# Patient Record
Sex: MALE | Race: WHITE | NOT HISPANIC OR LATINO | Employment: FULL TIME | URBAN - METROPOLITAN AREA
[De-identification: names, ages, dates, MRNs, and addresses within clinical notes are randomized per-mention and may not be internally consistent; named-entity substitution may affect disease eponyms.]

---

## 2022-08-25 ENCOUNTER — HOSPITAL ENCOUNTER (EMERGENCY)
Facility: CLINIC | Age: 42
Discharge: HOME | End: 2022-08-26
Attending: EMERGENCY MEDICINE | Admitting: EMERGENCY MEDICINE
Payer: COMMERCIAL

## 2022-08-25 DIAGNOSIS — F19.10 POLYSUBSTANCE ABUSE (H): ICD-10-CM

## 2022-08-25 LAB
ALCOHOL BREATH TEST: 0 (ref 0–0.01)
AMPHETAMINES UR QL SCN: ABNORMAL
BARBITURATES UR QL: ABNORMAL
BENZODIAZ UR QL: ABNORMAL
CANNABINOIDS UR QL SCN: ABNORMAL
COCAINE UR QL: ABNORMAL
HOLD SPECIMEN: NORMAL
HOLD SPECIMEN: NORMAL
OPIATES UR QL SCN: ABNORMAL
SARS-COV-2 RNA RESP QL NAA+PROBE: NEGATIVE

## 2022-08-25 PROCEDURE — 99219 PR INITIAL OBSERVATION CARE,LEVEL II: CPT | Performed by: EMERGENCY MEDICINE

## 2022-08-25 PROCEDURE — 90791 PSYCH DIAGNOSTIC EVALUATION: CPT

## 2022-08-25 PROCEDURE — 80307 DRUG TEST PRSMV CHEM ANLYZR: CPT | Performed by: EMERGENCY MEDICINE

## 2022-08-25 PROCEDURE — 250N000013 HC RX MED GY IP 250 OP 250 PS 637: Performed by: EMERGENCY MEDICINE

## 2022-08-25 PROCEDURE — 82075 ASSAY OF BREATH ETHANOL: CPT | Performed by: EMERGENCY MEDICINE

## 2022-08-25 PROCEDURE — C9803 HOPD COVID-19 SPEC COLLECT: HCPCS | Performed by: EMERGENCY MEDICINE

## 2022-08-25 PROCEDURE — 99285 EMERGENCY DEPT VISIT HI MDM: CPT | Mod: 25 | Performed by: EMERGENCY MEDICINE

## 2022-08-25 PROCEDURE — U0005 INFEC AGEN DETEC AMPLI PROBE: HCPCS | Performed by: EMERGENCY MEDICINE

## 2022-08-25 RX ORDER — PANTOPRAZOLE SODIUM 20 MG/1
20 TABLET, DELAYED RELEASE ORAL DAILY
Status: DISCONTINUED | OUTPATIENT
Start: 2022-08-25 | End: 2022-08-26 | Stop reason: HOSPADM

## 2022-08-25 RX ORDER — QUETIAPINE FUMARATE 50 MG/1
50 TABLET, FILM COATED ORAL AT BEDTIME
Status: DISCONTINUED | OUTPATIENT
Start: 2022-08-25 | End: 2022-08-26 | Stop reason: HOSPADM

## 2022-08-25 RX ORDER — QUETIAPINE FUMARATE 25 MG/1
1 TABLET, FILM COATED ORAL 2 TIMES DAILY
COMMUNITY
Start: 2022-03-14

## 2022-08-25 RX ORDER — QUETIAPINE FUMARATE 25 MG/1
25 TABLET, FILM COATED ORAL ONCE
Status: COMPLETED | OUTPATIENT
Start: 2022-08-25 | End: 2022-08-25

## 2022-08-25 RX ORDER — QUETIAPINE FUMARATE 25 MG/1
TABLET, FILM COATED ORAL
Qty: 90 TABLET | Refills: 0 | Status: SHIPPED | OUTPATIENT
Start: 2022-08-25

## 2022-08-25 RX ORDER — HYDROXYZINE HYDROCHLORIDE 50 MG/1
50 TABLET, FILM COATED ORAL EVERY 8 HOURS PRN
Status: DISCONTINUED | OUTPATIENT
Start: 2022-08-25 | End: 2022-08-26 | Stop reason: HOSPADM

## 2022-08-25 RX ORDER — TRAZODONE HYDROCHLORIDE 100 MG/1
1.5 TABLET ORAL DAILY
COMMUNITY
Start: 2022-03-13

## 2022-08-25 RX ORDER — SILDENAFIL 100 MG/1
1 TABLET, FILM COATED ORAL DAILY
COMMUNITY
Start: 2022-06-20 | End: 2023-05-15

## 2022-08-25 RX ORDER — LISINOPRIL 10 MG/1
10 TABLET ORAL DAILY
COMMUNITY
Start: 2022-03-12

## 2022-08-25 RX ORDER — ALBUTEROL SULFATE 90 UG/1
1-2 AEROSOL, METERED RESPIRATORY (INHALATION) DAILY
COMMUNITY
Start: 2022-06-04 | End: 2023-05-15

## 2022-08-25 RX ORDER — QUETIAPINE FUMARATE 25 MG/1
25 TABLET, FILM COATED ORAL 2 TIMES DAILY PRN
Status: DISCONTINUED | OUTPATIENT
Start: 2022-08-25 | End: 2022-08-26 | Stop reason: HOSPADM

## 2022-08-25 RX ORDER — VENLAFAXINE HYDROCHLORIDE 150 MG/1
1 CAPSULE, EXTENDED RELEASE ORAL DAILY
COMMUNITY
Start: 2022-03-13

## 2022-08-25 RX ORDER — CALCIUM CARBONATE 500 MG/1
500 TABLET, CHEWABLE ORAL DAILY PRN
Status: DISCONTINUED | OUTPATIENT
Start: 2022-08-25 | End: 2022-08-26 | Stop reason: HOSPADM

## 2022-08-25 RX ORDER — HYDROXYZINE HYDROCHLORIDE 25 MG/1
TABLET, FILM COATED ORAL
Qty: 60 TABLET | Refills: 0 | Status: SHIPPED | OUTPATIENT
Start: 2022-08-25

## 2022-08-25 RX ORDER — OMEPRAZOLE 40 MG/1
1 CAPSULE, DELAYED RELEASE ORAL DAILY
COMMUNITY
Start: 2022-06-04 | End: 2023-05-15

## 2022-08-25 RX ADMIN — ANTACID TABLETS 500 MG: 500 TABLET, CHEWABLE ORAL at 20:39

## 2022-08-25 RX ADMIN — QUETIAPINE FUMARATE 50 MG: 50 TABLET ORAL at 21:19

## 2022-08-25 RX ADMIN — QUETIAPINE FUMARATE 25 MG: 25 TABLET ORAL at 15:36

## 2022-08-25 RX ADMIN — PANTOPRAZOLE SODIUM 20 MG: 20 TABLET, DELAYED RELEASE ORAL at 20:53

## 2022-08-25 ASSESSMENT — ACTIVITIES OF DAILY LIVING (ADL)
ADLS_ACUITY_SCORE: 35
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
## 2022-08-25 NOTE — DISCHARGE INSTRUCTIONS
Substance Use Assessment Recommendation:    Walk-in behavioral health support through [facility]    Visit the [facility]  [address] [place] [address]    M-F, 9 a.m. to 9 p.m.    Sober Housing Program Recommendation:    [facility]    [address] [place] [address]    Main: [phone]     Fax: [phone]    Referral/Intake Line: [phone]    Aftercare Plan  If I am feeling unsafe or I am in a crisis, I will:   Contact my established care providers   Call the National Suicide Prevention Lifeline: 988  Go to the nearest emergency room   Call 491     Warning signs that I or other people might notice when a crisis is developing for me:  Increased paranoia that is commanding you to do harmful things    Things I am able to do on my own to cope or help me feel better: go for a walk, take deep breathes     Things that I am able to do with others to cope or help me better: talk to sober supports    Things I can use or do for distraction: This technique will take you through your five senses to help remind you of the present. This is a calming technique that can help you get through tough or stressful situations.     5 - LOOK: Look around for 5 things that you can see, and say them out loud. For example, you could say, I see the computer, I see the cup, I see the picture frame.     4 - FEEL: Pay attention to your body and think of 4 things that you can feel, and say them out loud. For example, you could say, I feel my feet warm in my socks, I feel the hair on the back of my neck, or I feel the pillow I am sitting on.    3 - LISTEN: Listen for 3 sounds. It could be the sound of traffic outside, the sound of typing or the sound of your tummy rumbling. Say the three things out loud.    2 - SMELL: Say two things you can smell. If you re allowed to, it s okay to move to another spot and sniff something. If you can t smell anything at the moment or you  "can t move, then name your 2 favorite smells.    1 - TASTE: Say one thing you can taste. It may be the toothpaste from brushing your teeth, or a mint from after lunch. If you can t taste anything, then say your favorite thing to taste.    Changes I can make to support my mental health and wellness: stop using drugs and take your medications as prescribed    People in my life that I can ask for help: Therapist, ROSALINO max, Carmen    [facility] has a mental health crisis team you can call 24/7: [facility]  [phone]      Additional resources and information:   [facility] has Walk In Hours for their Rule 25 Assessments, [address] [place], ([phone] ) at 7am   Or Walk in at [place] at [address] [facility], [place], [address], upper level [address] ([phone]) at 8am on Fridays      Crisis Lines  Crisis Text Line  Text 343817  You will be connected with a trained live crisis counselor to provide support.    Por espanol, texto  GABRIELLE a 007958 o texto a 442-AYUDAME en WhatsApp    [employer] (LGBTQ Youth Crisis Line)  5.729.515.8259  text START to 985-135      Community Resources  Fast Tracker  Linking people to mental health and substance use disorder resources  fasttrackermn.org     [place] Mental Health Warm Line  Peer to peer support  Monday thru Saturday, 12 pm to 10 pm  693.106.1528 or 1.737.961.4221  Text \"Support\" to 39840    National [place] on Mental Illness (ED)  098.380.6831 or 1.888.ED.HELPS      Mental Health Apps  My3  https://my3app.org/    VirtualHopeBox  https://PassKit.org/apps/virtual-hope-box/      Additional Information  Today you were seen by a licensed mental health professional through Triage and Transition services, [facility])  for a crisis assessment in the Emergency Department at [facility].  It is recommended that you follow up with your established providers (psychiatrist, mental health therapist, and/or primary care " doctor - as relevant) as soon as possible. Coordinators from [facility] will be calling you in the next 24-48 hours to ensure that you have the resources you need.  You can also contact [facility] coordinators directly at [phone]. You may have been scheduled for or offered an appointment with a mental health provider. [facility] maintains an extensive network of licensed behavioral health providers to connect patients with the services they need.  We do not charge providers a fee to participate in our referral network.  We match patients with providers based on a patient's specific needs, insurance coverage, and location.  Our first effort will be to refer you to a provider within your care system, and will utilize providers outside your care system as needed.
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
## 2022-08-25 NOTE — ED TRIAGE NOTES
Pt states he is having auditory hallucinations and the voices are threatening him at time and or just talking to him.  Pt also has been using meth and ALCOHOL and he last used alcohol yesterday and meth two days ago  .   Triage Assessment     Row Name 08/25/22 [id number]       Triage Assessment (Adult)    Airway WDL WDL       Respiratory WDL    Respiratory WDL WDL       Skin Circulation/Temperature WDL    Skin Circulation/Temperature WDL WDL       Cardiac WDL    Cardiac WDL WDL       Peripheral/Neurovascular WDL    Peripheral Neurovascular WDL WDL       Cognitive/Neuro/Behavioral WDL    Cognitive/Neuro/Behavioral WDL WDL

## 2022-08-25 NOTE — ED NOTES
"Pt states that he has been on a \"franco\"  He has been using alcohol and meth. Last alcohol was last night. Last meth used was several days ago  "

## 2022-08-26 VITALS
TEMPERATURE: 98.1 F | HEART RATE: 75 BPM | SYSTOLIC BLOOD PRESSURE: 155 MMHG | DIASTOLIC BLOOD PRESSURE: 102 MMHG | OXYGEN SATURATION: 97 % | RESPIRATION RATE: 18 BRPM

## 2022-08-26 PROCEDURE — 99217 PR OBSERVATION CARE DISCHARGE: CPT | Performed by: EMERGENCY MEDICINE

## 2022-08-26 PROCEDURE — 250N000013 HC RX MED GY IP 250 OP 250 PS 637: Performed by: EMERGENCY MEDICINE

## 2022-08-26 RX ORDER — VENLAFAXINE HYDROCHLORIDE 150 MG/1
150 CAPSULE, EXTENDED RELEASE ORAL
Status: DISCONTINUED | OUTPATIENT
Start: 2022-08-26 | End: 2022-08-26 | Stop reason: HOSPADM

## 2022-08-26 RX ORDER — LISINOPRIL 10 MG/1
10 TABLET ORAL DAILY
Status: DISCONTINUED | OUTPATIENT
Start: 2022-08-26 | End: 2022-08-26 | Stop reason: HOSPADM

## 2022-08-26 RX ADMIN — ANTACID TABLETS 500 MG: 500 TABLET, CHEWABLE ORAL at 03:11

## 2022-08-26 RX ADMIN — VENLAFAXINE HYDROCHLORIDE 150 MG: 150 CAPSULE, EXTENDED RELEASE ORAL at 13:09

## 2022-08-26 RX ADMIN — PANTOPRAZOLE SODIUM 20 MG: 20 TABLET, DELAYED RELEASE ORAL at 13:09

## 2022-08-26 RX ADMIN — LISINOPRIL 10 MG: 10 TABLET ORAL at 13:09

## 2022-08-26 ASSESSMENT — ACTIVITIES OF DAILY LIVING (ADL)
ADLS_ACUITY_SCORE: 35

## 2022-08-26 NOTE — DISCHARGE SUMMARY
ED Observation Discharge Summary  [facility]  Discharge Date: 8/26/2022    Quinn Cohen MRN: [id number]   Age: 42 year old YOB: 1980     Brief HPI & Initial ED Course     Chief Complaint   Patient presents with     Drug / Alcohol Assessment     Suicidal     HPI  Quinn Cohen is a 42 year old male who presented to the ED with altered mental status. Initial history was limited due to altered mental status. The patient arrived with altered mental status with reason to suspect alcohol or other drug intoxication as etiology, and an exam that was without findings suggestive of trauma.     Upon being evaluated in the emergency department, the patient was found to have a condition that would benefit from ongoing monitoring. Observation care was initiated with plan for close clinical monitoring of the patient and his mental status for clearing of intoxicating substance, and broadening of the work-up if not clearing appropriately or if other indications develop.     See ED Observation H&P for further details on the patient's presenting history and initial evaluation.     Physical Exam   BP: 129/81  Pulse: 98  Temp: 98.1  F (36.7  C)  Resp: 16  SpO2: 99 %    Physical Exam  General: no acute distress. Alert.   HENT: MMM. Atraumatic head.   Eyes: PERRL, normal sclerae   Neck: non-tender, supple  Cardio: Regular rate. Regular rhythm.   Resp: Normal work of breathing, normal respiratory rate  Abdomen: no tenderness, non-distended, no rebound, no guarding  Neuro: alert, fully oriented. Steady gait. CN II-XII grossly intact. Grossly normal strength and sensation.   Integumentary/Skin: no rash visualized, normal color    Results      Procedures              Labs Ordered and Resulted from Time of ED Arrival to Time of ED Departure   DRUG ABUSE SCREEN 1 URINE (ED) - Abnormal       Result Value    Amphetamines Urine Screen Positive (*)     Barbiturates Urine Screen Negative       Benzodiazepines Urine Screen Positive (*)     Cannabinoids Urine Screen Positive (*)     Cocaine Urine Screen Negative      Opiates Urine Screen Negative     COVID-19 VIRUS (CORONAVIRUS) BY PCR - Normal    SARS CoV2 PCR Negative     ALCOHOL BREATH TEST POCT - Normal    Alcohol Breath Test 0.00              Observation Course   The patient was admitted to observation status with plan for close clinical monitoring of the patient and his mental status for clearing of intoxicating substance, and broadening of the work-up if not clearing appropriately or if other indications develop.     Serial assessments of the patient's mental status were performed. Nursing notes were reviewed. During the observation period, the patient did not require medications for agitation, and did not require restraints/seclusion for patient and/or provider safety.        With monitoring, patient's mental status cleared. Patient then clinically sober with steady gait and tolerating PO. Normalization of mental status with sobering makes other causes of altered mental status very unlikely.     After counseling on the diagnosis, work-up, and treatment plan, the patient was discharged. Recommended safe cessation of EtOH/drugs and provided information on community treatment resources.  Patient was reassessed by the mental health  when his sober living facility would not take him back.  He was provided with outpatient resources.  Patient to return to the ED if any urgent or potentially life-threatening concerns.    Discharge Diagnoses:   Final diagnoses:   Polysubstance abuse (H)       --  Chance Javier MD  [facility] EMERGENCY DEPARTMENT  8/26/2022

## 2022-08-26 NOTE — ED NOTES
"Venus ([phone]) Director of [facility] called back and stated that Randall maddox \" we do not have anyone that will be picking up the patient. He is homeless and he needs Residential treatment or inpatient treatment. He could not keep himself sober and that really concerns me.\"  "

## 2022-08-26 NOTE — ED NOTES
Writer called Randall [phone] ( Manager of [facility]) per Randall he will [place] and have someone  pt. Randall will be calling back for updates

## 2022-08-26 NOTE — ED NOTES
"Triage & Transition Services, Extended Care     Therapy Progress Note    Patient: Quinn goes by \"Quinn,\" uses he/him pronouns  Date of Service: August 26, 2022  Site of Service: [facility]  Patient was seen in-person.     Presenting problem:   Quinn is followed related to discharge planning and referrals needed. Please see initial DEC/[facility] Crisis Assessment completed by BALWINDER Suárez on 8/25/2022 for complete assessment information. Notable concerns include polysubstance use.     Individuals Present: Quinn & DAWIT PORTILLO, JESUS, [facility]  Session start: 5:15PM  Session end: 5:35PM  Session duration in minutes: 15  Session number: 1  Anticipated number of sessions or this episode of care: 1  CPT utilized: 51070 - Psychotherapy (with patient) - 30 (16-37*) min    Current Presentation:   Writer met with patient and explained the rationale for the intervention. Patient appeared oriented x4, cooperative and engaged. Patient reported to writer that he was ready to discharge and did not feel like he needed to be in the hospital anymore. Writer validated this and informed patient of the search for crisis beds as well as outpatient resources, as patient was unable to return to his sober living home due to not being able to remain sober. Patient agreeable and informed writer that he had a safe place with a friend to discharge to. Writer reviewed resources with patient related to substance use referrals and next steps. Patient agreeable to this and informed writer that he had been discussing options for placement with his .     Mental Status Exam:   Appearance: awake, alert  Attitude: cooperative  Eye Contact: good  Mood: good  Affect: mood congruent  Speech: clear, coherent  Psychomotor Behavior: no evidence of tardive dyskinesia, dystonia, or tics  Thought Process:  logical  Associations: no loose associations  Thought Content: no evidence of suicidal ideation or homicidal " ideation  Insight: limited  Judgement: intact  Oriented to: time, person, and place  Attention Span and Concentration: intact  Recent and Remote Memory: intact    Diagnosis:   304.40 (F15.20) Moderate, Amphetamine type substance     Therapeutic Intervention(s):   Provided active listening, unconditional positive regard, and validation. Engaged in safety planning.  Explored motivation for behavioral change.    Treatment Objective(s) Addressed:   The focus of this session was on rapport building and safety planning.     Progress Towards Goals:   Patient reports stable symptoms. Patient is making progress towards treatment goals as evidenced by being receptive to discharge planning.     Case Management:   Patient reported being in contact with his  for discharge planning needs and placement.     General Recommendations:   Continue to monitor for harm. Consider: Listen in a neutral, non-judgmental way. Offer reassurance    Plan:   Writer followed up with attending MD, Dr. Javier re: to patient's disposition planning. At this time, discharge with MI/ORQUIDEA specific resources is recommended based on patient's ability to engage in safety planning and expressing a motivation to work on is substance use concerns.    Plan for Care reviewed with Assigned Medical Provider? Yes: Dr. Yaya HUFFMAN, MSW, LGSW, [facility]   Licensed Mental Health Professional (LMHP), [facility]  [phone]

## 2022-10-03 ENCOUNTER — APPOINTMENT (OUTPATIENT)
Dept: RADIOLOGY | Facility: HOSPITAL | Age: 42
End: 2022-10-03
Attending: EMERGENCY MEDICINE
Payer: COMMERCIAL

## 2022-10-03 ENCOUNTER — HOSPITAL ENCOUNTER (EMERGENCY)
Facility: HOSPITAL | Age: 42
Discharge: HOME | End: 2022-10-03
Payer: COMMERCIAL

## 2022-10-03 VITALS
OXYGEN SATURATION: 99 % | DIASTOLIC BLOOD PRESSURE: 96 MMHG | SYSTOLIC BLOOD PRESSURE: 144 MMHG | BODY MASS INDEX: 39.87 KG/M2 | HEIGHT: 69 IN | RESPIRATION RATE: 16 BRPM | HEART RATE: 97 BPM | TEMPERATURE: 98.4 F

## 2022-10-03 DIAGNOSIS — J06.9 VIRAL URI WITH COUGH: ICD-10-CM

## 2022-10-03 LAB
FLUAV RNA SPEC QL NAA+PROBE: NEGATIVE
FLUBV RNA RESP QL NAA+PROBE: NEGATIVE
RSV RNA SPEC NAA+PROBE: NEGATIVE
SARS-COV-2 RNA RESP QL NAA+PROBE: NEGATIVE

## 2022-10-03 PROCEDURE — 99283 EMERGENCY DEPT VISIT LOW MDM: CPT | Mod: CS,25

## 2022-10-03 PROCEDURE — C9803 HOPD COVID-19 SPEC COLLECT: HCPCS

## 2022-10-03 PROCEDURE — 71046 X-RAY EXAM CHEST 2 VIEWS: CPT

## 2022-10-03 PROCEDURE — 87637 SARSCOV2&INF A&B&RSV AMP PRB: CPT | Performed by: EMERGENCY MEDICINE

## 2022-10-03 RX ORDER — DEXTROMETHORPHAN POLISTIREX 30 MG/5ML
60 SUSPENSION ORAL 2 TIMES DAILY
Qty: 89 ML | Refills: 0 | Status: SHIPPED | OUTPATIENT
Start: 2022-10-03

## 2022-10-03 RX ORDER — BENZONATATE 100 MG/1
100 CAPSULE ORAL 3 TIMES DAILY PRN
Qty: 20 CAPSULE | Refills: 0 | Status: SHIPPED | OUTPATIENT
Start: 2022-10-03

## 2022-10-03 RX ORDER — IBUPROFEN 200 MG
400 TABLET ORAL ONCE
Status: DISCONTINUED | OUTPATIENT
Start: 2022-10-03 | End: 2022-10-03 | Stop reason: HOSPADM

## 2022-10-03 ASSESSMENT — ENCOUNTER SYMPTOMS
MYALGIAS: 1
LIGHT-HEADEDNESS: 0
SORE THROAT: 1
ABDOMINAL PAIN: 0
FEVER: 1
VOMITING: 0
COUGH: 1
NAUSEA: 0
HEADACHES: 0
CHILLS: 1

## 2022-10-03 NOTE — ED PROVIDER NOTES
Patient:   MARTÍNEZ SMITH            MRN: [id number]            FIN: [id number]              Age:   84 years     Sex:  FEMALE     :  37   Associated Diagnoses:   None   Author:   GRANT BARTLETT     Subjective       Patient seen and examined 4/10  Overnight events reviewed  Pericardial drain removed  Off pressors  On RA        Objective   Intake and Output   I & O between:  2020 11:11 TO 10-APR-2020 11:11  Med Dosing Weight:  83.3  kg   2020  24 Hour Intake:   240.00  ( 2.88 mL/kg )  24 Hour Output:   550.00           24 Hour Urine/Stool Output:   0.0  24 Hour Balance:   -310.00           24 Hour Urine Output:   550.00  ( 0.28 mL/kg/hr )                   Urine Count:  1         VS/Measurements   Vitals between:   2020 11:11:57   TO   10-APR-2020 11:11:57                   LAST RESULT MINIMUM MAXIMUM  Temperature 37.4 36.6 37.4  Heart Rate 82 80 92  Respiratory Rate 18 16 20  NISBP           130 104 130  NIDBP           69 59 72  NIMBP           89 73 106  SpO2                    98 95 100      Lines and Tubes:    LINES  Peripheral Intravenous Hand Left   Gauge: 22   Charted: 04/10/20 08:00  Inserted: 20   Days Since Insertion: 1 days  Indication of Use: Saline Lock   .    Support:  NO VENTILATORS QUALIFIED      Point of care testing:  Oxygen saturation  98  %, RA.    General:  Alert and oriented, No acute distress.    Eye:  no gaze preference.    HENT:  Normocephalic.    Neck:  Supple, no accessory muscle use or stridor.    Respiratory:  Respirations are non-labored, no wheezing.    Cardiovascular:  Normal rate, Regular rhythm, low-pitched 2/6 systolic murmur.   Gastrointestinal:  Soft, Non-tender, Non-distended, Normal bowel sounds.   Musculoskeletal     no edema.     Integumentary:  Warm, Pink, Intact.    Neurologic:  Alert.    Psychiatric:  Cooperative.      Results Review   General results   Interpretation:   Labs between:  2020 11:11 to 10-APR-2020  EMERGENCY DEPARTMENT ENCOUNTER      NAME: Quinn Cohen  AGE: 42 year old male  YOB: 1980  MRN: [id number]  EVALUATION DATE & TIME: No admission date for patient encounter.    PCP: [facility]    ED PROVIDER: Shavon López PA-C    Chief Complaint   Patient presents with     Cough     Shortness of Breath       FINAL IMPRESSION:  1. Viral URI with cough        MEDICAL DECISION MAKING:    Pertinent Labs & Imaging studies reviewed. (See chart for details)  Quinn Cohen is a 42 year old male who presents for evaluation of body aches, fever, chills and cough x5 days. No known sick contacts. Did not receive covid or influenza vaccinations. Patient reports he is mostly here for a work note so he can go back to work.     On my initial evaluation, vitals normal. on physical exam, patient is awake, alert in no acute distress resting comfortably in chair. Heart sounds normal, lungs clear, without wheeze or crackles. he does have a cough. no abdominal tenderness on palpation. pharynx without erythema or exudate. bilateral TMs normal without erythema or discharge. remainder of exam unremarkable    Differential diagnosis is covid, influenza, pneumonia, other viral pathology. emergency department workup included covid and influenza swabs and CXR. patient was given ibuprofen with some improvement in symptoms.    Workup thus far unremarkable. COVID and influenza negative. CXR without pneumonia. Suspect this is another viral URI. Vitals reassuring and patient otherwise well appearing. Did recommend symptomatic treatment and encouraged him to continue staying well hydrated and follow cdc recommendations guidelines for proper handwashing and social distancing.  Provided work note, Tessalon Perles and Delsym as well as strict return precautions.    Patient has had serial examinations and notes significant improvement.     Patient was discharged in stable condition with treatment plan as below.  11:11  CBC:                 WBC  HgB  Hct  Plt  MCV  RDW   10-APR-2020 6.0  (L) 9.0  (L) 29.6  286  81.1  (H) 16.1   BMP:                 Na  Cl  BUN  Glu   10-APR-2020 142  (H) 109  20  87                              K  CO2  Cr  Ca                              4.3  29  (H) 1.18  8.7   Other Chem:             Mg  Phos  Triglycerides  GGTP  DirectBili                           (H) 2.6  3.5         POC GLU:                 Latest Result  Latest Date  Minimum  Min Date  Maximum  Max Date                             83 10-APR-2020 71 09-APR-2020 (H) 110  09-APR-2020                         Impression and Plan            Assessment and Plan   # S/P shock likely obstructive and Cardiogenic shock  # Hypotension- resolved now  # Cardiac tamponade-- with obstructive shock   # s/p pericardial drain 4/6 -> 410ml serous fluid in cath lab; 50ml over the last 24 hours  - Colchicine   - ASA   - cardiac MRI neg  # Bradycardia   - after admission to MICU HR briefly in 30s with MAP in 50s, resolved with 1 dose of atropine  - no residual fluid on repeat echo 4/6   - Coreg restarted  # Chronic systolic CHF   - med mgt per Cardio   - Coreg , ACEI restarted  # COPD   - stable, no wheezing  # SALOME CKD  - Cr is slowly improving   - ATN due to shock, CTM UO   - avoid nephrotoxins  # FULL CODE  # DVT proph- heparin SQ  Altaf Beth MD  Pulmonary and critical care medicine  [facility] Critical [facility] and sleep physicians   Instructed to follow up with primary care provider in 3 days and return to the emergency department with any new or worsening of symptoms. Patient expressed understanding, feels comfortable, and is in agreement with this plan. All questions addressed prior to discharge.    ED COURSE:  4:40 PM  I reviewed the patient's chart. I met with the patient to gather history and to perform my initial exam.    I wore appropriate PPE during this encounter including: facemask & eye protection   6:34 PM I rechecked the patient and updated him on results. We discussed plan for discharge including treatment plan, follow-up and return precautions to emergency department.  Patient voiced understanding and in agreement with this plan.    At the conclusion of the encounter I discussed the results of all of the tests and the disposition. The questions were answered. The patient or family acknowledged understanding and was agreeable with the care plan.     MEDICATIONS GIVEN IN THE EMERGENCY:  Medications - No data to display    NEW PRESCRIPTIONS STARTED AT TODAY'S ER VISIT  Discharge Medication List as of 10/3/2022  6:35 PM      START taking these medications    Details   benzonatate (TESSALON) 100 MG capsule Take 1 capsule (100 mg) by mouth 3 times daily as needed for cough, Disp-20 capsule, R-0, Local Print      dextromethorphan (DELSYM) 30 MG/5ML liquid Take 10 mLs (60 mg) by mouth 2 times daily, Disp-89 mL, R-0, Local Print                  =================================================================    HPI:    Patient information was obtained from: Patient    Use of Interpretor: N/A        Quinn BRIE Cohen is a 42 year old male with a pertinent history of obesity, hypertension, diabetes mellitus type 2, GERD, polysubstance abuse (alcohol, methamphetamine) who presents to this ED by walk in for evaluation of body aches, cough. Patient endorses symptoms of body aches, a cough, and sore throat since 09/29 (4 days ago). He currently  endorses a mild fever and chills. Patient attempted to take ibuprofen and Mucinex with some relief, but his symptoms persist.    Patient reports he lives in a household with 8 other people, but is unsure if any of them are sick. Patient is not vaccinated against COVID-19 or influenza.    He denies a headache, lightheadedness, abdominal pain, nausea, vomiting. No other reported complaints at this time.      REVIEW OF SYSTEMS:  Review of Systems   Constitutional: Positive for chills and fever (low grade).   HENT: Positive for sore throat.    Respiratory: Positive for cough.    Gastrointestinal: Negative for abdominal pain, nausea and vomiting.   Musculoskeletal: Positive for myalgias (diffuse body aches).   Neurological: Negative for light-headedness and headaches.   All other systems reviewed and are negative.      PAST MEDICAL HISTORY:  No past medical history on file.    PAST SURGICAL HISTORY:  Past Surgical History:   Procedure Laterality Date     HERNIA REPAIR         CURRENT MEDICATIONS:    No current facility-administered medications for this encounter.    Current Outpatient Medications:      benzonatate (TESSALON) 100 MG capsule, Take 1 capsule (100 mg) by mouth 3 times daily as needed for cough, Disp: 20 capsule, Rfl: 0     dextromethorphan (DELSYM) 30 MG/5ML liquid, Take 10 mLs (60 mg) by mouth 2 times daily, Disp: 89 mL, Rfl: 0     hydrOXYzine (ATARAX) 25 MG tablet, Take 1-2 tablets every 8 hours as needed for panic, Disp: 60 tablet, Rfl: 0     lisinopril (ZESTRIL) 10 MG tablet, Take 10 mg by mouth daily, Disp: , Rfl:      metoclopramide (REGLAN) 10 MG tablet, Take 1 tablet (10 mg) by mouth 4 times daily as needed (nausea or headache), Disp: 12 tablet, Rfl: 0     omeprazole (PRILOSEC) 40 MG DR capsule, Take 1 capsule by mouth daily, Disp: , Rfl:      QUEtiapine (SEROQUEL) 25 MG tablet, Take 1 tablet by mouth twice daily as needed for anxiety and 1-2 tablets at night for sleep, Disp: 90 tablet, Rfl: 0      "QUEtiapine (SEROQUEL) 25 MG tablet, Take 1 tablet by mouth 2 times daily, Disp: , Rfl:      sildenafil (VIAGRA) 100 MG tablet, Take 1 tablet by mouth daily, Disp: , Rfl:      traZODone (DESYREL) 100 MG tablet, Take 1.5 tablets by mouth daily, Disp: , Rfl:      venlafaxine (EFFEXOR XR) 150 MG 24 hr capsule, Take 1 capsule by mouth daily, Disp: , Rfl:      VENTOLIN  (90 Base) MCG/ACT inhaler, Inhale 1-2 puffs into the lungs daily, Disp: , Rfl:     ALLERGIES:  No Known Allergies    FAMILY HISTORY:  No family history on file.    SOCIAL HISTORY:   Social History     Socioeconomic History     Marital status: Single   Tobacco Use     Smoking status: Current Every Day Smoker   Substance and Sexual Activity     Alcohol use: Yes     Comment: drinks 12 shots of vodka/daily     Drug use: No       VITALS:  Patient Vitals for the past 24 hrs:   BP Temp Temp src Pulse Resp SpO2 Height   10/03/22 1629 (!) 144/96 98.4  F (36.9  C) Oral 97 16 99 % 1.753 m (5' 9\")       PHYSICAL EXAM    Constitutional: Well developed, Well nourished, NAD, resting comfortably in chair  HENT: Normocephalic, Atraumatic, Bilateral external ears normal, Oropharynx normal, mucous membranes moist, Nose normal.   Neck: Normal range of motion, No tenderness, Supple, No stridor.  Eyes: PERRL, EOMI, Conjunctiva normal, No discharge.   Respiratory: Normal breath sounds, No respiratory distress, No wheezing, Speaks full sentences easily. Productive cough  Cardiovascular: Normal heart rate, Regular rhythm, No murmurs, No rubs, No gallops. Chest wall nontender.  GI: Soft, No tenderness, No masses, No flank tenderness. No rebound or guarding.  Musculoskeletal: 2+ DP pulses. No edema. No cyanosis, No clubbing. Good range of motion in all major joints. No tenderness to palpation or major deformities noted. No tenderness of the CTLS spine.   Integument: Warm, Dry, No erythema, No rash. No petechiae.  Neurologic: Alert & oriented x 3, Normal motor function, Normal " sensory function, No focal deficits noted. Normal gait.  Psychiatric: Affect normal, Judgment normal, Mood normal. Cooperative.    LAB:  All pertinent labs reviewed and interpreted.  Labs Ordered and Resulted from Time of ED Arrival to Time of ED Departure   INFLUENZA A/B & SARS-COV2 PCR MULTIPLEX - Normal       Result Value    Influenza A PCR Negative      Influenza B PCR Negative      RSV PCR Negative      SARS CoV2 PCR Negative         RADIOLOGY:  Reviewed all pertinent imaging. Please see official radiology report.  XR Chest 2 Views   Final Result   IMPRESSION: Heart size and pulmonary vascularity normal. The lungs are clear.            Diagnosis:  1. Viral URI with cough        I, J Carlos Ervin, am serving as a scribe to document services personally performed by Shavon López PA-C based on my observation and the provider's statements to me. I, Shavon López PA-C attest that J Carlos Ervin is acting in a scribe capacity, has observed my performance of the services and has documented them in accordance with my direction.    Shavon López PA-C  Emergency Medicine  [facility]  10/3/2022     Shavon López PA-C  10/03/22 6080

## 2022-10-03 NOTE — ED NOTES
"ED Provider In Triage Note  [facility]  Encounter Date: Oct 3, 2022    Chief Complaint   Patient presents with     Cough     Shortness of Breath       Brief HPI:   Quinn Cohen is a 42 year old male presenting to the Emergency Department with a chief complaint of cough and nasal congestion with body aches for the past few days.  No fevers.  No problems with smell or taste. No sick contacts.    Brief Physical Exam:  BP (!) 144/96   Pulse 97   Temp 98.4  F (36.9  C) (Oral)   Resp 16   Ht 1.753 m (5' 9\")   SpO2 99%   BMI 39.87 kg/m    General: Non-toxic appearing  HEENT: Atraumatic  Resp: No respiratory distress  Abdomen: Non-peritoneal  Neuro: Alert, oriented, answers questions appropriately  Psych: Behavior appropriate      Plan Initiated in Triage:  Orders Placed This Encounter     XR Chest 2 Views     Symptomatic; Unknown Influenza A/B & SARS-CoV2 (COVID-19) Virus PCR Multiplex           PIT Dispo:   Return to lobby while awaiting workup and ED bed availability    Duc Newsome MD on 10/3/2022 at 4:27 PM    Patient was evaluated by the Physician in Triage due to a limitation of available rooms in the Emergency Department. A plan of care was discussed based on the information obtained on the initial evaluation and patient was consuled to return back to the Emergency Department lobby after this initial evalutaiton until results were obtained or a room became available in the Emergency Department. Patient was counseled not to leave prior to receiving the results of their workup.     Duc Newsome MD  [facility] EMERGENCY DEPARTMENT  [address] [place] [id number]  [phone]       Duc Newsome MD  10/03/22 1630    "

## 2022-10-03 NOTE — ED TRIAGE NOTES
Patient presents t ED with body body aches, cough, and runny nore for the past 3 days.      Triage Assessment     Row Name 10/03/22 8701       Triage Assessment (Adult)    Airway WDL WDL       Respiratory WDL    Respiratory WDL WDL       Skin Circulation/Temperature WDL    Skin Circulation/Temperature WDL WDL       Cardiac WDL    Cardiac WDL WDL       Peripheral/Neurovascular WDL    Peripheral Neurovascular WDL WDL       Cognitive/Neuro/Behavioral WDL    Cognitive/Neuro/Behavioral WDL WDL

## 2022-10-03 NOTE — Clinical Note
Quinn Cohen was seen and treated in our emergency department on 10/3/2022.  He may return to work on 10/05/2022.       If you have any questions or concerns, please don't hesitate to call.      Shavon López PA-C

## 2022-10-03 NOTE — DISCHARGE INSTRUCTIONS
Please bring this paperwork with you to your follow-up appointment.    You were seen in the urgent care/emergency department for cough, fever, body aches.     You were negative for COVID and influenza, however we suspect you have another virus causing your symptoms.     For your symptoms:  Please take Tessalon perles for your cough as directed. You may also use Delsym for cough at night to help you sleep.     Tylenol/ibuprofen as needed  You may take up to 650 mg of Tylenol (acetaminophen) up to 4 times daily and up to 600 mg of ibuprofen up to 4 times daily as needed for fever, pain.  Please do not take more than the daily maximum recommended dose (tylenol = 4 grams, ibuprofen = 2.4 grams) as it can cause harm to your liver, kidneys, stomach.  It is best to take ibuprofen with food. Please read labels of any over-the-counter medicine you may be taking as it may contain Tylenol (acetaminophen) or Advil (ibuprofen).     Follow up with your primary care provider for recheck in 3 days for ER follow up.     Return to the emergency department if you develop, or any other new worsening or concerning symptoms.     We'd be happy to see you again.    Thank you for allowing us to be part of your care today.    Take care!  -Shavon López PA-C

## 2022-12-10 ENCOUNTER — HOSPITAL ENCOUNTER (EMERGENCY)
Facility: HOSPITAL | Age: 42
Discharge: HOME | End: 2022-12-10
Attending: EMERGENCY MEDICINE | Admitting: EMERGENCY MEDICINE
Payer: COMMERCIAL

## 2022-12-10 VITALS
HEIGHT: 70 IN | WEIGHT: 225 LBS | RESPIRATION RATE: 18 BRPM | OXYGEN SATURATION: 95 % | TEMPERATURE: 98.7 F | SYSTOLIC BLOOD PRESSURE: 163 MMHG | BODY MASS INDEX: 32.21 KG/M2 | HEART RATE: 95 BPM | DIASTOLIC BLOOD PRESSURE: 81 MMHG

## 2022-12-10 DIAGNOSIS — H16.133 PHOTOKERATITIS OF BOTH EYES: ICD-10-CM

## 2022-12-10 PROCEDURE — 250N000009 HC RX 250: Performed by: STUDENT IN AN ORGANIZED HEALTH CARE EDUCATION/TRAINING PROGRAM

## 2022-12-10 PROCEDURE — 99284 EMERGENCY DEPT VISIT MOD MDM: CPT

## 2022-12-10 RX ORDER — TETRACAINE HYDROCHLORIDE 5 MG/ML
1-2 SOLUTION OPHTHALMIC ONCE
Status: COMPLETED | OUTPATIENT
Start: 2022-12-10 | End: 2022-12-10

## 2022-12-10 RX ORDER — OXYCODONE HYDROCHLORIDE 5 MG/1
5 TABLET ORAL EVERY 6 HOURS PRN
Qty: 10 TABLET | Refills: 0 | Status: SHIPPED | OUTPATIENT
Start: 2022-12-10 | End: 2022-12-13

## 2022-12-10 RX ORDER — ERYTHROMYCIN 5 MG/G
0.5 OINTMENT OPHTHALMIC 4 TIMES DAILY
Qty: 3.5 G | Refills: 0 | Status: SHIPPED | OUTPATIENT
Start: 2022-12-10 | End: 2022-12-15

## 2022-12-10 RX ADMIN — FLUORESCEIN SODIUM 1 STRIP: 1 STRIP OPHTHALMIC at 18:21

## 2022-12-10 RX ADMIN — TETRACAINE HYDROCHLORIDE 2 DROP: 5 SOLUTION OPHTHALMIC at 18:21

## 2022-12-10 ASSESSMENT — ENCOUNTER SYMPTOMS
EYE PAIN: 1
HEADACHES: 0
FEVER: 0
SHORTNESS OF BREATH: 0
PHOTOPHOBIA: 1

## 2022-12-10 ASSESSMENT — VISUAL ACUITY: OU: 20/100

## 2022-12-10 NOTE — ED TRIAGE NOTES
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

## 2022-12-10 NOTE — Clinical Note
Quinn Cohen was seen and treated in our emergency department on 12/10/2022.  He may return to work on 12/14/2022.  Quinn can return to work on 12/14/22 or as directed by his eye specialist.     If you have any questions or concerns, please don't hesitate to call.      Gracia Sterling MD

## 2022-12-11 NOTE — ED PROVIDER NOTES
EMERGENCY DEPARTMENT ENCOUNTER      NAME: Quinn Cohen  AGE: 42 year old male  YOB: 1980  MRN: [id number]  EVALUATION DATE & TIME: 12/10/2022  5:55 PM    PCP: Cait[facility]    ED PROVIDER: Gracia Sterling M.D.        Chief Complaint   Patient presents with     Eye Pain         FINAL IMPRESSION:    1. Photokeratitis of both eyes            MEDICAL DECISION MAKIN year old male who presents emergency department with eye pain and facial pain from a welding exposure.  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  Examination and history suggestive of consistent with photokeratitis and some UV number type injury to the face.  Plan at this time is discharge home with prescription for erythromycin eye ointment as well as a prescription for oxycodone.  I did use tetracaine during my fluorescein exam and the tetracaine bottle was removed from the room by myself.  Patient will follow-up with ophthalmology early next week and referral has been sent to [facility] as well as [facility] ophthalmology for follow-up.      ED COURSE:  6:06 PM  I met with the patient to gather history and perform my exam. ED course and treatment discussed.    6:43 PM  I spoke with patient about what to expect as far as healing goes and the importance of following up with ophthalmology.  Referrals have been sent to both [facility] and [facility] ophthalmology.  I prescribed erythromycin eye ointment for the eye as well as oxycodone to help with the pain.  He does not wear contact lenses.  Tetanus immunization is up-to-date.  I do not think he requires emergent ophthalmology evaluation as this seems consistent with photokeratitis from UV welding exposure.  Something patient does have access to appropriate eye protection and he is aware of the importance going forward.  He also has some  facial pain as well which be consistent with the UV exposure to the face and I instructed him how to care for this as well, in general he would treat this like he would have traditional sunburn.  I do not think he requires any other medications and no indication for laboratory evaluation or radiology imaging.  I do not have concerns for things like glaucoma, central retinal artery occlusion Central retinal vein occlusion, or other etiologies.    COVID-19 PPE worn during patient evaluation:  Mask: n95 and homemade masks   Eye Protection: goggles   Gown: none   Hair cover: yes  Face shield: none   Patient wearing a mask: yes     At the conclusion of the encounter I discussed the results of all of the tests and the disposition. Their questions were answered. The patient (and any family present) acknowledged understanding and were agreeable with the care plan.       CONSULTANTS:  Referral to ophtho sent to [facility] and [facility] to expedite f/u        MEDICATIONS GIVEN IN THE EMERGENCY:  Medications   tetracaine (PONTOCAINE) 0.5 % ophthalmic solution 1-2 drop (2 drops Both Eyes Given 12/10/22 1821)   fluorescein (FUL-ANA) ophthalmic strip 1 strip (1 strip Both Eyes Given by Other 12/10/22 1821)           NEW PRESCRIPTIONS STARTED AT TODAY'S ER VISIT     Medication List      Started    erythromycin 5 MG/GM ophthalmic ointment  Commonly known as: ROMYCIN  0.5 inches, Both Eyes, 4 TIMES DAILY     oxyCODONE 5 MG tablet  Commonly known as: ROXICODONE  5 mg, Oral, EVERY 6 HOURS PRN                CONDITION:  stable        DISPOSITION:  discharge home with          =================================================================  =================================================================  TRIAGE ASSESSMENT:  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       ED Triage Vitals [12/10/22 5281]   Enc Vitals Group      BP (!) 163/81       Pulse       Resp 18      Temp 98.7  F (37.1  C)      Temp src       SpO2 99 %      Weight 102.1 kg (225 lb)          ================================================================  ================================================================    HPI    Patient information was obtained from: patient    Use of Intrepreter: N/A      Quinn BRIE Cohen is a 42 year old male with history of obesity who presents to the ER with complaints of eye pain and face pain.  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  His biggest concern at this time is the eye pain.  Hurts to even open his eyes and very photosensitive.  Otherwise denies any other injuries.  Eyes any other foreign bodies in the eyes.  Denies any past medical history. Pt does not wear contact lenses.     Last Td was 2019.    REVIEW OF SYSTEMS  Review of Systems   Constitutional: Negative for fever.   Eyes: Positive for photophobia, pain and visual disturbance.   Respiratory: Negative for shortness of breath.    Allergic/Immunologic: Negative for immunocompromised state.   Neurological: Negative for headaches.       PAST MEDICAL HISTORY:  History reviewed. No pertinent past medical history.      PAST SURGICAL HISTORY:  Past Surgical History:   Procedure Laterality Date     HERNIA REPAIR           CURRENT MEDICATIONS:    Prior to Admission medications    Medication Sig Start Date End Date Taking? Authorizing Provider   benzonatate (TESSALON) 100 MG capsule Take 1 capsule (100 mg) by mouth 3 times daily as needed for cough 10/3/22   Shavon López PA-C   dextromethorphan (DELSYM) 30 MG/5ML liquid Take 10 mLs (60 mg) by mouth 2 times daily 10/3/22   Shavon López PA-C   hydrOXYzine (ATARAX) 25 MG tablet Take 1-2 tablets every 8 hours as needed for panic 8/25/22   Hilario, Marybeth Vesna, MD   lisinopril (ZESTRIL) 10 MG tablet Take 10 mg by mouth daily 3/12/22   Reported, Patient  "  metoclopramide (REGLAN) 10 MG tablet Take 1 tablet (10 mg) by mouth 4 times daily as needed (nausea or headache) 1/14/15   Catherine Allan MD   omeprazole (PRILOSEC) 40 MG DR capsule Take 1 capsule by mouth daily 6/4/22   Reported, Patient   QUEtiapine (SEROQUEL) 25 MG tablet Take 1 tablet by mouth twice daily as needed for anxiety and 1-2 tablets at night for sleep 8/25/22   Marybeth Rich MD   QUEtiapine (SEROQUEL) 25 MG tablet Take 1 tablet by mouth 2 times daily 3/14/22   Reported, Patient   sildenafil (VIAGRA) 100 MG tablet Take 1 tablet by mouth daily 6/20/22   Reported, Patient   traZODone (DESYREL) 100 MG tablet Take 1.5 tablets by mouth daily 3/13/22   Reported, Patient   venlafaxine (EFFEXOR XR) 150 MG 24 hr capsule Take 1 capsule by mouth daily 3/13/22   Reported, Patient   VENTOLIN  (90 Base) MCG/ACT inhaler Inhale 1-2 puffs into the lungs daily 6/4/22   Reported, Patient         ALLERGIES:  No Known Allergies      FAMILY HISTORY:  History reviewed. No pertinent family history.      SOCIAL HISTORY:  Social History     Socioeconomic History     Marital status: Single   Tobacco Use     Smoking status: Every Day   Substance and Sexual Activity     Alcohol use: Yes     Comment: drinks 12 shots of vodka/daily     Drug use: No         VITALS:  Patient Vitals for the past 24 hrs:   BP Temp Pulse Resp SpO2 Height Weight   12/10/22 1822 -- -- -- -- -- 1.778 m (5' 10\") --   12/10/22 1807 -- -- 95 -- 95 % -- --   12/10/22 1754 (!) 163/81 98.7  F (37.1  C) -- 18 99 % -- 102.1 kg (225 lb)       Wt Readings from Last 3 Encounters:   12/10/22 102.1 kg (225 lb)   02/20/15 122.5 kg (270 lb)   08/28/06 117 kg (258 lb)       CrCl cannot be calculated (Patient's most recent lab result is older than the maximum 30 days allowed.).    PHYSICAL EXAM    Constitutional:  Well developed, Well nourished, NAD, GCS 15  HENT:  Normocephalic, Atraumatic, Bilateral external ears normal, Nose normal. Neck- Supple, " No stridor.  Eyes:  PERRL, EOMI, Conjunctiva with very minimal injection. +tearing.  No purulent or colored discharge.  With fluorescein exam and tetracaine there is some punctate uptake but no large ulcers.  No foreign bodies appreciated.  Vision otherwise unremarkable.  He does have some bilateral mild eyelid swelling.  Respiratory:  Normal breath sounds, No respiratory distress, No wheezing, Speaks full sentences easily. No cough.   Cardiovascular:  Normal heart rate, Regular rhythm  GI:  No excessive obesity.    : deferred  Musculoskeletal: No cyanosis, No clubbing. Good range of motion in all major joints. No major deformities noted.   Integument:  Warm, Dry, mild erythema of face and eye lids c/w UV exposure, No rash.  No petechiae.   Neurologic:  Alert & oriented x 3, Normal motor function, Normal sensory function, No focal deficits noted. Normal gait.   Psychiatric:  Affect normal, Cooperative         LAB:  none    RADIOLOGY:  none    EKG:    none      PROCEDURES:  None      Medical Decision Making    History:    Supplemental history from: N/A    External Record(s) reviewed: Documented in [facility], if applicable.    Work Up:    Chart documentation includes differential considered and any EKGs or imaging interpreted by provider.    In additional to work up documented, I considered the following work up: See chart documentation, if applicable.    External consultation:    Discussion of management with another provider: See chart documentation, if applicable    Complicating factors:    Care impacted by chronic illness: None    Care affected by social determinants of health: N/A    Disposition considerations: Discharge. I prescribed additional prescription strength medication(s) as charted. I considered admission, but discharged patient after reassuring labs and/or imaging.      Gracia Sterling M.D. [facility]  Emergency Medicine and Medical Toxicology  [facility]  [facility] EMERGENCY DEPARTMENT  [address] [place] [id number]  [phone]  Dept: [phone]           Gracia Sterling MD  12/10/22 6310

## 2022-12-11 NOTE — ED NOTES
Patient reports that he was not using his shield correctly at work and has pain in both his eyes, with drainage, and light sensitivity.

## 2022-12-11 NOTE — DISCHARGE INSTRUCTIONS
You have a photokeratitis injury to your eyes and face from the UV light from the welding device.  This is like a bad sunburn to these areas. Use the oxycodone pain medication to help control your eye pain, though be aware this kind of pain is very difficult to control.    Use the eye ointment antibiotic to help prevent infection.  This can also help provide some lubrication and a little bit of soothing relief to the eyes    Call on Monday and make an appointment to follow-up with an eye specialist.  You can consider the [facility] or [facility].  [facility] is located right here in [place] for convenience.  To call [facility] you call [phone] to schedule an appointment.    Return to emergency department if you develop worse pain, fever, vision loss, persistent thick eye drainage, or any other concerns.    Thank you for choosing [facility].  It has been my pleasure caring for you today.     ~Dr. Hallie MD

## 2022-12-12 ENCOUNTER — OFFICE VISIT (OUTPATIENT)
Dept: OPHTHALMOLOGY | Facility: CLINIC | Age: 42
End: 2022-12-12
Attending: EMERGENCY MEDICINE
Payer: COMMERCIAL

## 2022-12-12 DIAGNOSIS — H16.133 PHOTOKERATITIS OF BOTH EYES: Primary | ICD-10-CM

## 2022-12-12 PROCEDURE — 99204 OFFICE O/P NEW MOD 45 MIN: CPT | Performed by: OPHTHALMOLOGY

## 2022-12-12 PROCEDURE — G0463 HOSPITAL OUTPT CLINIC VISIT: HCPCS

## 2022-12-12 RX ORDER — FLUOROMETHOLONE 0.1 %
1 SUSPENSION, DROPS(FINAL DOSAGE FORM)(ML) OPHTHALMIC (EYE) 3 TIMES DAILY
Qty: 10 ML | Refills: 0 | Status: SHIPPED | OUTPATIENT
Start: 2022-12-12 | End: 2022-12-26

## 2022-12-12 ASSESSMENT — EXTERNAL EXAM - LEFT EYE: OS_EXAM: NORMAL

## 2022-12-12 ASSESSMENT — VISUAL ACUITY
METHOD: SNELLEN - LINEAR
OS_SC: 20/30
OD_SC: 20/25

## 2022-12-12 ASSESSMENT — EXTERNAL EXAM - RIGHT EYE: OD_EXAM: NORMAL

## 2022-12-12 ASSESSMENT — TONOMETRY
OD_IOP_MMHG: 13
OS_IOP_MMHG: 14
IOP_METHOD: ICARE

## 2022-12-12 ASSESSMENT — SLIT LAMP EXAM - LIDS
COMMENTS: NORMAL
COMMENTS: NORMAL

## 2022-12-12 NOTE — NURSING NOTE
Chief Complaints and History of Present Illnesses   Patient presents with     Keratitis Evaluation     Chief Complaint(s) and History of Present Illness(es)     Keratitis Evaluation            Associated symptoms: red eyes, redness of lids and eye pain    Response to treatment: moderate improvement    Pain scale: 3/10          Comments    New pt here today for photokeratitis evaluation from Dr. Sterling.  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  States after work BE were watering, burning and grainy.   Pt c/o light sensitivity - just wants to be in the dark.  Pt also interested in medical cannibas.    Ocular meds = none    Elton Oliva COA, PEARL 2:17 PM 12/12/2022

## 2022-12-12 NOTE — PATIENT INSTRUCTIONS
Please perform the following:     Eye drops: (please wait approximately 5 minutes between drops if they contain medication)  -Preservative-free artificial tears 1 drop to both eye(s) at least 4 times a day     -examples include: Refresh Plus, Systane preservative-free, Thera Tears, as well as those made by your favorite stores (just please make sure they say preservative-free on them and are in separate vials/dropperettes)    -please be aware that many of these can be re-capped, so as to reduce waste of these please check your specific tears type      Ointment: (be careful of blurry vision and reduce your chances of falling after placing these, 1/4 inch is approximately the size of a grain of rice)  -Artificial tear ointment 1/4 inch to Both eye(s) at bedtime     -examples include: Refresh PM, Lacrilube, etc.    -you may also use thicker drops such as gel drops that last longer but with caution as they blur your vision and should never be placed immediately before activities such as driving; examples include: Systane gel drops, Genteal gel drops, Refresh Liquigel or Celluvisc    Other:   -Take frequent breaks when you read, watch television, or use a computer. Close your eyes. Do not rub your eyes. Artificial tears may help you when you do these activities.  -Use wrap-around sunglasses to protect your eyes from the sun, wind, and grit  -Use a humidifier to increase the moisture in the air  -Avoid smoke, wind, hair dryers, air conditioners, and aerosol sprays  -Drink adequate amounts of water to avoid dehydration  -Do warm compresses 1-2 times daily. This will help loosen up the oils in your eyelids so they can make it to the surface of your eyeball. Process: Heat up a warm compress (uncooked rice in a new, clean sock) to a safe temperature in the microwave, place it over the closed eye for 5 minutes, remove it from the eye, and then massage your eyelids gently (put your finger on your bottom eyelid and roll up in  the direction of your eyeball, then put your finger on your top eyelid and roll down in the direction of your eyeball).   -Gently wash your eyelashes with baby shampoo daily. This will help remove debris from the exit points of the eyelid oil glands.

## 2022-12-12 NOTE — LETTER
December 12, 2022      Re: Quinn Cohen   1980    To Whom It May Concern:    This is to confirm that the above patient was seen on 12/12/2022.    Thank you for your cooperation in this matter.  Please do not hesitate to contact me if you have any further questions.    Sincerely,      BLANCA DOBBINS M.D

## 2022-12-12 NOTE — PROGRESS NOTES
"Chief complaint   Irritation OU    Referring Provider:  Referring Provider: Gracia TRINH    Quinn Duprees 42 year old male       Chief Complaint(s) and History of Present Illness(es)     Keratitis Evaluation    Associated symptoms include red eyes, redness of lids and eye pain.  Response to treatment was moderate improvement.  Pain was noted as 3/10.           Comments    New pt here today for photokeratitis evaluation from Dr. Sterling.  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  States after work BE were watering, burning and grainy.   Pt c/o light sensitivity - just wants to be in the dark.  Pt states Dr. Sterling put \"cocaine drops\" in his eyes - helped with pain.  Pt also interested in medical cannibas.    Ocular meds = none    Elton GALLARDO, PEARL 2:17 PM 12/12/2022                      Past ocular history   Prior eye surgery/laser/Trauma: No  CTL wearer:No  Glasses : -  Family Hx of eye disease: No    PMH   History reviewed. No pertinent past medical history.    PSH     Past Surgical History:   Procedure Laterality Date     HERNIA REPAIR         Meds     Current Outpatient Medications   Medication     benzonatate (TESSALON) 100 MG capsule     dextromethorphan (DELSYM) 30 MG/5ML liquid     erythromycin (ROMYCIN) 5 MG/GM ophthalmic ointment     hydrOXYzine (ATARAX) 25 MG tablet     lisinopril (ZESTRIL) 10 MG tablet     metoclopramide (REGLAN) 10 MG tablet     omeprazole (PRILOSEC) 40 MG DR capsule     oxyCODONE (ROXICODONE) 5 MG tablet     QUEtiapine (SEROQUEL) 25 MG tablet     QUEtiapine (SEROQUEL) 25 MG tablet     sildenafil (VIAGRA) 100 MG tablet     traZODone (DESYREL) 100 MG tablet     venlafaxine (EFFEXOR XR) 150 MG 24 hr capsule     VENTOLIN  (90 Base) MCG/ACT inhaler     No current facility-administered medications for this visit.       Labs   -    Imaging   -    Drops Currently Taking   -    Assessment/Plan   # Photo keratitis OU    -Patient had " the injury on Friday, did not start any treatment yet.    -exam showed some conjunctival +fluo and surface dryness. No intraocular injury    Plan:  -start FML TID for 2 weeks  -Artificial tear drops and ointments given today  -patient is reassured       Follow up:  Oph: as needed if worsening  Others:  No follow-ups on file.    Attending Physician Attestation:  Complete documentation of historical and exam elements from today's encounter can be found in the full encounter summary report (not reduplicated in this progress note).  I personally obtained the chief complaint(s) and history of present illness.  I confirmed and edited as necessary the review of systems, past medical/surgical history, family history, social history, and examination findings as documented by others; and I examined the patient myself.  I personally reviewed the relevant tests, images, and reports as documented above.  I formulated and edited as necessary the assessment and plan and discussed the findings and management plan with the patient and family. - Mar Cisneros MD

## 2023-01-06 ENCOUNTER — HOSPITAL ENCOUNTER (EMERGENCY)
Facility: HOSPITAL | Age: 43
Discharge: HOME | End: 2023-01-06
Admitting: PHYSICIAN ASSISTANT
Payer: COMMERCIAL

## 2023-01-06 ENCOUNTER — APPOINTMENT (OUTPATIENT)
Dept: RADIOLOGY | Facility: HOSPITAL | Age: 43
End: 2023-01-06
Attending: PHYSICIAN ASSISTANT
Payer: COMMERCIAL

## 2023-01-06 VITALS
TEMPERATURE: 100.1 F | HEIGHT: 69 IN | DIASTOLIC BLOOD PRESSURE: 76 MMHG | BODY MASS INDEX: 32.58 KG/M2 | RESPIRATION RATE: 16 BRPM | OXYGEN SATURATION: 99 % | WEIGHT: 220 LBS | HEART RATE: 98 BPM | SYSTOLIC BLOOD PRESSURE: 158 MMHG

## 2023-01-06 DIAGNOSIS — J10.1 INFLUENZA A: ICD-10-CM

## 2023-01-06 DIAGNOSIS — B34.9 VIRAL SYNDROME: ICD-10-CM

## 2023-01-06 DIAGNOSIS — J45.909 REACTIVE AIRWAY DISEASE: ICD-10-CM

## 2023-01-06 LAB
FLUAV RNA SPEC QL NAA+PROBE: POSITIVE
FLUBV RNA RESP QL NAA+PROBE: NEGATIVE
RSV RNA SPEC NAA+PROBE: NEGATIVE
SARS-COV-2 RNA RESP QL NAA+PROBE: NEGATIVE

## 2023-01-06 PROCEDURE — 94640 AIRWAY INHALATION TREATMENT: CPT

## 2023-01-06 PROCEDURE — 250N000009 HC RX 250: Performed by: PHYSICIAN ASSISTANT

## 2023-01-06 PROCEDURE — 96372 THER/PROPH/DIAG INJ SC/IM: CPT | Performed by: PHYSICIAN ASSISTANT

## 2023-01-06 PROCEDURE — 71046 X-RAY EXAM CHEST 2 VIEWS: CPT

## 2023-01-06 PROCEDURE — C9803 HOPD COVID-19 SPEC COLLECT: HCPCS

## 2023-01-06 PROCEDURE — 99284 EMERGENCY DEPT VISIT MOD MDM: CPT | Mod: CS,25

## 2023-01-06 PROCEDURE — 87637 SARSCOV2&INF A&B&RSV AMP PRB: CPT | Performed by: PHYSICIAN ASSISTANT

## 2023-01-06 PROCEDURE — 250N000013 HC RX MED GY IP 250 OP 250 PS 637: Performed by: PHYSICIAN ASSISTANT

## 2023-01-06 PROCEDURE — 250N000011 HC RX IP 250 OP 636: Performed by: PHYSICIAN ASSISTANT

## 2023-01-06 PROCEDURE — 250N000012 HC RX MED GY IP 250 OP 636 PS 637: Performed by: PHYSICIAN ASSISTANT

## 2023-01-06 RX ORDER — ACETAMINOPHEN 325 MG/1
975 TABLET ORAL ONCE
Status: COMPLETED | OUTPATIENT
Start: 2023-01-06 | End: 2023-01-06

## 2023-01-06 RX ORDER — KETOROLAC TROMETHAMINE 30 MG/ML
30 INJECTION, SOLUTION INTRAMUSCULAR; INTRAVENOUS ONCE
Status: COMPLETED | OUTPATIENT
Start: 2023-01-06 | End: 2023-01-06

## 2023-01-06 RX ORDER — ALBUTEROL SULFATE 90 UG/1
2 AEROSOL, METERED RESPIRATORY (INHALATION) EVERY 6 HOURS PRN
Qty: 18 G | Refills: 0 | Status: SHIPPED | OUTPATIENT
Start: 2023-01-06

## 2023-01-06 RX ORDER — OSELTAMIVIR PHOSPHATE 75 MG/1
75 CAPSULE ORAL 2 TIMES DAILY
Qty: 10 CAPSULE | Refills: 0 | Status: SHIPPED | OUTPATIENT
Start: 2023-01-06 | End: 2023-01-11

## 2023-01-06 RX ORDER — ALBUTEROL SULFATE 5 MG/ML
2.5 SOLUTION RESPIRATORY (INHALATION) ONCE
Status: COMPLETED | OUTPATIENT
Start: 2023-01-06 | End: 2023-01-06

## 2023-01-06 RX ORDER — PREDNISONE 20 MG/1
40 TABLET ORAL ONCE
Status: COMPLETED | OUTPATIENT
Start: 2023-01-06 | End: 2023-01-06

## 2023-01-06 RX ORDER — PREDNISONE 20 MG/1
TABLET ORAL
Qty: 10 TABLET | Refills: 0 | Status: SHIPPED | OUTPATIENT
Start: 2023-01-06

## 2023-01-06 RX ADMIN — KETOROLAC TROMETHAMINE 30 MG: 30 INJECTION, SOLUTION INTRAMUSCULAR at 14:57

## 2023-01-06 RX ADMIN — PREDNISONE 40 MG: 20 TABLET ORAL at 14:59

## 2023-01-06 RX ADMIN — ALBUTEROL SULFATE 2.5 MG: 2.5 SOLUTION RESPIRATORY (INHALATION) at 14:59

## 2023-01-06 RX ADMIN — ACETAMINOPHEN 975 MG: 325 TABLET, FILM COATED ORAL at 14:55

## 2023-01-06 ASSESSMENT — ENCOUNTER SYMPTOMS
VOMITING: 0
CHILLS: 1
HEADACHES: 1
DIARRHEA: 0
COUGH: 1
MYALGIAS: 1
WHEEZING: 1
FEVER: 1
SHORTNESS OF BREATH: 1
SORE THROAT: 0

## 2023-01-06 NOTE — DISCHARGE INSTRUCTIONS
As we discussed, your symptoms are likely due to a viral illness.  Please rest, drink plenty of fluids, and use Tylenol or Motrin as needed for fever or body aches.  We will start you on several days of steroids to help with your breathing as well as refilled your albuterol inhaler.  I would like you to have a close recheck in your primary care clinic within the next week to make sure symptoms are improving.  In the meantime, if you develop any new or worsening symptoms such as worsening difficulty breathing, chest pain, dizziness, loss of consciousness, neck stiffness, rashes, or any new or concerning symptoms please return to the ER for further evaluation.    Your blood pressure was elevated in the emergencydepartment today and requires recheck and close follow-up in your primary care clinic. Untreated blood pressure can cause serious complications including, but not limited to stroke, heart attack/failure, and kidney disease.  Please make a close follow-up appointment to have this recheck performed. Please return to the emergency department immediately if you develop a severe headache, vision changes, chest pain, shortness of breath, orabdominal pain.

## 2023-01-06 NOTE — Clinical Note
Quinn Cohen was seen and treated in our emergency department on 1/6/2023.  He may return to work on 01/10/2023.       If you have any questions or concerns, please don't hesitate to call.      Livia Yu PA-C

## 2023-01-06 NOTE — ED PROVIDER NOTES
Emergency Department Encounter   NAME: Quinn Cohen ; AGE: 42 year old male ; YOB: 1980 ; MRN: [id number] ; PCP: [facility]   ED PROVIDER: Livia Yu PA-C    Evaluation Date & Time:   No admission date for patient encounter.    CHIEF COMPLAINT:  Flu Symptoms      Impression and Plan   MDM: Quinn Cohen is a 42 year old male with a pertinent history of asthma, EtOH abuse, DM II, HTN, obesity, chronic GERD, anxiety, and depression who presents to the ED by walk-in for evaluation of flu-like symptoms.  The patient presents to the emergency department for evaluation of subjective fever, cough, myalgias, and wheezing over the past several days in the setting of multiple roommates at his sober living facility that are ill with similar symptoms.  Here in the ER, he does have a low-grade fever of 100.1 though is otherwise vitally stable.  He is in no acute distress and is nontoxic-appearing, though is warm to the touch and disheveled.  Tylenol ordered for his fever.  On exam, he does have expiratory wheezes in his mid and lower lung fields though he is breathing comfortably without increased effort and is speaking in full sentences.  He is a tobacco user and also has a history of asthma.  Albuterol nebulizer ordered as well as oral prednisone.  The remainder of his exam was unremarkable.  Symptoms at this time are suggestive of viral etiology.  IM Toradol ordered for his headache and body aches, will send patient for chest x-ray as well as viral swabs to further assess.  No murmur, recent IV drug use, or other evidence to suggest infectious endocarditis.  No concerns for meningitis or more sinister pathology at this time.    Chest x-ray negative for consolidation or infiltrate concerning for pneumonia.  Viral swab sent and pending.  Nursing staff administered nebulizer treatment, Toradol, and Tylenol, however when I attempted to reassess the patient and he is no longer in the  waiting room.  He unfortunately did have quite a long ER wait in the waiting room due to increased wait times, and I suspect he may of left prior to discharge.  Prescriptions for prednisone and a new albuterol inhaler printed off and left at the .  Patient is likely having some reactive airway disease in the setting of underlying asthma/tobacco use with viral illness and would benefit from these prescriptions at home.  If he was feeling improved, and repeat vitals are reassuring, I was suspecting that he could discharged home.  We will still connect with the patient if any of his viral swabs returned positive.      *Patient examination and workup was initiated in triage due to inpatient hospital and Emergency Department bed shortage resulting in long waiting room wait times. Patient and/or guardian's consent was obtained.       Medical Decision Making    History:    Supplemental history from: N/A    External Record(s) reviewed: Documented in HPI, if applicable.    Work Up:    Chart documentation includes differential considered and any EKGs or imaging independently interpreted by provider.    In additional to work up documented, I considered the following work up: See chart documentation, if applicable.    External consultation:    Discussion of management with another provider: See chart documentation, if applicable    Complicating factors:    Care impacted by chronic illness: Diabetes, Hypertension and Mental Health    Care affected by social determinants of health: Alcohol Abuse and/or Recreational Drug Use      ED COURSE:  12:10 PM I met and introduced myself to the patient. I gathered initial history and performed my physical exam. We discussed plan for initial workup.   3:24 PM attempted to recheck the patient and discussed discharge, however I am unable to find him in the waiting room.  We will have nursing staff call for him another time.  3:52 PM influenza A test returned positive and I suspect  that this is the source of his symptoms.  Called patient and left voicemail.  Could consider Tamiflu given his underlying asthma though would discuss pros and cons with patient if he returns call.    At the conclusion of the encounter I discussed the results of all the tests and the disposition. The questions were answered. The patient or family acknowledged understanding and was agreeable with the care plan.    FINAL IMPRESSION:    ICD-10-CM    1. Viral syndrome  B34.9       2. Reactive airway disease  J45.909       3. Influenza A  J10.1             MEDICATIONS GIVEN IN THE EMERGENCY DEPARTMENT:  Medications   acetaminophen (TYLENOL) tablet 975 mg (975 mg Oral Given 1/6/23 1455)   ketorolac (TORADOL) injection 30 mg (30 mg Intramuscular Given 1/6/23 1457)   albuterol (PROVENTIL) neb solution 2.5 mg (2.5 mg Nebulization Given 1/6/23 1459)   predniSONE (DELTASONE) tablet 40 mg (40 mg Oral Given 1/6/23 1459)       NEW PRESCRIPTIONS STARTED AT TODAY'S ED VISIT:  New Prescriptions    ALBUTEROL (PROAIR HFA/PROVENTIL HFA/VENTOLIN HFA) 108 (90 BASE) MCG/ACT INHALER    Inhale 2 puffs into the lungs every 6 hours as needed for shortness of breath, wheezing or cough    PREDNISONE (DELTASONE) 20 MG TABLET    Take two tablets (= 40mg) each day for 5 (five) days       HPI   Patient information was obtained from: Patient  Use of Intrepreter: N/A    Quinn BRIE Cohen is a 42 year old male with a pertinent history of asthma, EtOH abuse, DM II, HTN, obesity, chronic GERD, anxiety, and depression who presents to the ED by walk-in for evaluation of flu-like symptoms.     Patient reports ongoing flu-like symptoms including fever, chills, dry cough, wheezing, shortness of breath, body aches, and headache for the last 2 days. Patient otherwise denies sore throat, vomiting, diarrhea, chest pain, or syncope. He has been taking his inhaler without significant relief. Patient took an at-home COVID-19 test which was negative. His roommates have  "been sick recently with similar symptoms, but patient is unsure about what they had. He is up-to-date on his COVID-19 vaccines, but did not get this year's influenza vaccine.    Of note, patient currently resides in a sober living home. He does have a history of IV drug use, but has not used in 4 months.    REVIEW OF SYSTEMS:  Review of Systems   Constitutional: Positive for chills and fever.   HENT: Negative for sore throat.    Respiratory: Positive for cough (dry), shortness of breath and wheezing.    Cardiovascular: Negative for chest pain.   Gastrointestinal: Negative for diarrhea and vomiting.   Musculoskeletal: Positive for myalgias (body aches).   Neurological: Positive for headaches. Negative for syncope.       Medical History     History reviewed. No pertinent past medical history.    Past Surgical History:   Procedure Laterality Date     HERNIA REPAIR         No family history on file.    Social History     Tobacco Use     Smoking status: Every Day   Substance Use Topics     Alcohol use: Yes     Comment: drinks 12 shots of vodka/daily     Drug use: No       albuterol (PROAIR HFA/PROVENTIL HFA/VENTOLIN HFA) 108 (90 Base) MCG/ACT inhaler  predniSONE (DELTASONE) 20 MG tablet  benzonatate (TESSALON) 100 MG capsule  dextromethorphan (DELSYM) 30 MG/5ML liquid  hydrOXYzine (ATARAX) 25 MG tablet  lisinopril (ZESTRIL) 10 MG tablet  metoclopramide (REGLAN) 10 MG tablet  omeprazole (PRILOSEC) 40 MG DR capsule  QUEtiapine (SEROQUEL) 25 MG tablet  QUEtiapine (SEROQUEL) 25 MG tablet  sildenafil (VIAGRA) 100 MG tablet  traZODone (DESYREL) 100 MG tablet  venlafaxine (EFFEXOR XR) 150 MG 24 hr capsule  VENTOLIN  (90 Base) MCG/ACT inhaler        Physical Exam     First Vitals:  Patient Vitals for the past 24 hrs:   BP Temp Temp src Pulse Resp SpO2 Height Weight   01/06/23 1053 (!) 158/76 100.1  F (37.8  C) Oral 98 16 99 % 1.753 m (5' 9\") 99.8 kg (220 lb)       PHYSICAL EXAM:   Physical Exam  Vitals and nursing note " reviewed.   Constitutional:       General: He is not in acute distress.     Appearance: He is not toxic-appearing.      Comments: Appears disheveled.  Warm to the touch.   HENT:      Head: Normocephalic.   Eyes:      Conjunctiva/sclera: Conjunctivae normal.      Pupils: Pupils are equal, round, and reactive to light.   Cardiovascular:      Rate and Rhythm: Normal rate and regular rhythm.      Heart sounds: Normal heart sounds. No murmur heard.    No friction rub.   Pulmonary:      Effort: Pulmonary effort is normal. No respiratory distress.      Comments: Speaking in full sentences.  Mild expiratory wheeze in his bilateral mid and lower lung fields.  No crackles.  Abdominal:      General: Abdomen is flat. There is no distension.      Palpations: Abdomen is soft.      Tenderness: There is no abdominal tenderness.   Musculoskeletal:      Cervical back: Normal range of motion and neck supple.      Comments: No janeway lesions or oslers nodes.    Skin:     General: Skin is warm and dry.   Neurological:      Mental Status: He is alert. Mental status is at baseline.           Results     LAB:  All pertinent labs reviewed and interpreted  Labs Ordered and Resulted from Time of ED Arrival to Time of ED Departure   INFLUENZA A/B & SARS-COV2 PCR MULTIPLEX - Abnormal       Result Value    Influenza A PCR Positive (*)     Influenza B PCR Negative      RSV PCR Negative      SARS CoV2 PCR Negative         RADIOLOGY:  Chest XR,  PA & LAT   Final Result   IMPRESSION: Negative chest.        I, Douglas Humphrey, am serving as a scribe to document services personally performed by Livia Yu PA-C, based on my observation and the provider's statements to me. I, Livia Yu PA-C attest that Douglas Humphrey is acting in a scribe capacity, has observed my performance of the services and has documented them in accordance with my direction.     Livia Yu PA-C   Emergency Medicine   [facility] EMERGENCY  DEPARTMENT     Livia Yu PA-C  01/06/23 1521       Livia Yu PA-C  01/06/23 [id number]

## 2023-05-15 ENCOUNTER — OFFICE VISIT (OUTPATIENT)
Dept: FAMILY MEDICINE | Facility: CLINIC | Age: 43
End: 2023-05-15
Payer: COMMERCIAL

## 2023-05-15 VITALS
OXYGEN SATURATION: 99 % | SYSTOLIC BLOOD PRESSURE: 137 MMHG | BODY MASS INDEX: 30.96 KG/M2 | WEIGHT: 209 LBS | DIASTOLIC BLOOD PRESSURE: 86 MMHG | TEMPERATURE: 97.5 F | RESPIRATION RATE: 18 BRPM | HEIGHT: 69 IN | HEART RATE: 105 BPM

## 2023-05-15 DIAGNOSIS — K21.00 GASTROESOPHAGEAL REFLUX DISEASE WITH ESOPHAGITIS, UNSPECIFIED WHETHER HEMORRHAGE: ICD-10-CM

## 2023-05-15 DIAGNOSIS — R73.03 PREDIABETES: ICD-10-CM

## 2023-05-15 DIAGNOSIS — N52.9 ERECTILE DYSFUNCTION, UNSPECIFIED ERECTILE DYSFUNCTION TYPE: ICD-10-CM

## 2023-05-15 DIAGNOSIS — R21 RASH: ICD-10-CM

## 2023-05-15 DIAGNOSIS — Z11.59 NEED FOR HEPATITIS C SCREENING TEST: ICD-10-CM

## 2023-05-15 DIAGNOSIS — J45.40 MODERATE PERSISTENT ASTHMA WITHOUT COMPLICATION: ICD-10-CM

## 2023-05-15 DIAGNOSIS — I10 ESSENTIAL HYPERTENSION: ICD-10-CM

## 2023-05-15 DIAGNOSIS — Z00.00 HEALTHCARE MAINTENANCE: Primary | ICD-10-CM

## 2023-05-15 LAB
GLUCOSE BLD-MCNC: 109 MG/DL (ref 60–99)
HBA1C MFR BLD: 6.4 % (ref 0–5.6)

## 2023-05-15 PROCEDURE — 99204 OFFICE O/P NEW MOD 45 MIN: CPT | Mod: 25 | Performed by: FAMILY MEDICINE

## 2023-05-15 PROCEDURE — 87591 N.GONORRHOEAE DNA AMP PROB: CPT | Performed by: FAMILY MEDICINE

## 2023-05-15 PROCEDURE — 80061 LIPID PANEL: CPT | Performed by: FAMILY MEDICINE

## 2023-05-15 PROCEDURE — 36415 COLL VENOUS BLD VENIPUNCTURE: CPT | Performed by: FAMILY MEDICINE

## 2023-05-15 PROCEDURE — 87491 CHLMYD TRACH DNA AMP PROBE: CPT | Performed by: FAMILY MEDICINE

## 2023-05-15 PROCEDURE — 86803 HEPATITIS C AB TEST: CPT | Performed by: FAMILY MEDICINE

## 2023-05-15 PROCEDURE — 83036 HEMOGLOBIN GLYCOSYLATED A1C: CPT | Performed by: FAMILY MEDICINE

## 2023-05-15 PROCEDURE — 86780 TREPONEMA PALLIDUM: CPT | Performed by: FAMILY MEDICINE

## 2023-05-15 PROCEDURE — 90471 IMMUNIZATION ADMIN: CPT | Performed by: FAMILY MEDICINE

## 2023-05-15 PROCEDURE — 90677 PCV20 VACCINE IM: CPT | Performed by: FAMILY MEDICINE

## 2023-05-15 PROCEDURE — 80048 BASIC METABOLIC PNL TOTAL CA: CPT | Performed by: FAMILY MEDICINE

## 2023-05-15 PROCEDURE — 87389 HIV-1 AG W/HIV-1&-2 AB AG IA: CPT | Performed by: FAMILY MEDICINE

## 2023-05-15 PROCEDURE — 82947 ASSAY GLUCOSE BLOOD QUANT: CPT | Mod: 59 | Performed by: FAMILY MEDICINE

## 2023-05-15 RX ORDER — LISINOPRIL 10 MG/1
10 TABLET ORAL DAILY
Qty: 90 TABLET | Refills: 3 | Status: SHIPPED | OUTPATIENT
Start: 2023-05-15

## 2023-05-15 RX ORDER — BUDESONIDE AND FORMOTEROL FUMARATE DIHYDRATE 80; 4.5 UG/1; UG/1
2 AEROSOL RESPIRATORY (INHALATION) 2 TIMES DAILY
Qty: 10.2 G | Refills: 3 | Status: SHIPPED | OUTPATIENT
Start: 2023-05-15

## 2023-05-15 RX ORDER — OMEPRAZOLE 40 MG/1
40 CAPSULE, DELAYED RELEASE ORAL DAILY
Qty: 90 CAPSULE | Refills: 3 | Status: SHIPPED | OUTPATIENT
Start: 2023-05-15 | End: 2024-05-16

## 2023-05-15 RX ORDER — ALBUTEROL SULFATE 90 UG/1
1-2 AEROSOL, METERED RESPIRATORY (INHALATION) DAILY
Qty: 18 G | Refills: 11 | Status: SHIPPED | OUTPATIENT
Start: 2023-05-15

## 2023-05-15 RX ORDER — SILDENAFIL 100 MG/1
100 TABLET, FILM COATED ORAL DAILY
Qty: 30 TABLET | Refills: 1 | Status: SHIPPED | OUTPATIENT
Start: 2023-05-15 | End: 2023-08-20

## 2023-05-15 RX ORDER — TRIAMCINOLONE ACETONIDE 1 MG/G
CREAM TOPICAL 2 TIMES DAILY
Qty: 80 G | Refills: 3 | Status: SHIPPED | OUTPATIENT
Start: 2023-05-15

## 2023-05-15 ASSESSMENT — ASTHMA QUESTIONNAIRES
ACT_TOTALSCORE: 13
QUESTION_4 LAST FOUR WEEKS HOW OFTEN HAVE YOU USED YOUR RESCUE INHALER OR NEBULIZER MEDICATION (SUCH AS ALBUTEROL): ONE OR TWO TIMES PER DAY
QUESTION_3 LAST FOUR WEEKS HOW OFTEN DID YOUR ASTHMA SYMPTOMS (WHEEZING, COUGHING, SHORTNESS OF BREATH, CHEST TIGHTNESS OR PAIN) WAKE YOU UP AT NIGHT OR EARLIER THAN USUAL IN THE MORNING: ONCE A WEEK
ACT_TOTALSCORE: 13
QUESTION_2 LAST FOUR WEEKS HOW OFTEN HAVE YOU HAD SHORTNESS OF BREATH: ONCE A DAY
QUESTION_1 LAST FOUR WEEKS HOW MUCH OF THE TIME DID YOUR ASTHMA KEEP YOU FROM GETTING AS MUCH DONE AT WORK, SCHOOL OR AT HOME: SOME OF THE TIME
EMERGENCY_ROOM_LAST_YEAR_TOTAL: TWO
QUESTION_5 LAST FOUR WEEKS HOW WOULD YOU RATE YOUR ASTHMA CONTROL: SOMEWHAT CONTROLLED

## 2023-05-15 NOTE — ASSESSMENT & PLAN NOTE
Right volar forearm, began 3 months ago, would use working on a .  Has used hydrocortisone to some success but it keeps coming back.  Now problem came back, admits that he rubs it quite a bit, painful  Trial triamcinolone, avoid rubbing it,

## 2023-05-15 NOTE — ASSESSMENT & PLAN NOTE
Patient's story shifting a little bit here, indicated that up to 2 x 100 mg pills of sildenafil was not always effective.  I offered to try Cialis but he declined, wanted more sildenafil instead, requesting at least 50/month of the 100  Top dose recommendation is 100 mg daily  I agreed only to this number per month.

## 2023-05-15 NOTE — ASSESSMENT & PLAN NOTE
Very severe if he misses his medication, discussed next step of referral to gastroenterology, renewed omeprazole 40

## 2023-05-15 NOTE — ASSESSMENT & PLAN NOTE
Run out of medications and blood pressure is borderline.  History of diabetes when he was more overweight.  Will restart lisinopril, follow-up 2 months.

## 2023-05-15 NOTE — ASSESSMENT & PLAN NOTE
Doing poorly, smoking  ACT is 13  Symptoms most nights wake him up, during the day as well  Start controller medicine, Symbicort, renewed albuterol, encouraged consideration of smoking cessation.  Patient is in the process treatment for methamphetamine and alcohol, recent slip ups.

## 2023-05-15 NOTE — LETTER
May 17, 2023      Quinn Cohen  [address] [place] [address]        Dear LibbyNoel,    We are writing to inform you of your test results.    Quinn, only concerning finding is that your diabetes test is just below the threshold for actual diabetes.  It is still in the prediabetes range.  Continue to work on regular exercise and healthy eating.  Also, avoiding carbohydrates in your diet.  We should probably check this every 6 months or at least every year.  I usually do not start medication until it is between 6.5 and 7    Resulted Orders   Chlamydia trachomatis/Neisseria gonorrhoeae by PCR   Result Value Ref Range    Chlamydia Trachomatis Negative Negative      Comment:      Negative for C. trachomatis rRNA by transcription mediated amplification.   A negative result by transcription mediated amplification does not preclude the presence of infection because results are dependent on proper and adequate collection, absence of inhibitors and sufficient rRNA to be detected.    Neisseria gonorrhoeae Negative Negative      Comment:      Negative for N. gonorrhoeae rRNA by transcription mediated amplification. A negative result by transcription mediated amplification does not preclude the presence of C. trachomatis infection because results are dependent on proper and adequate collection, absence of inhibitors and sufficient rRNA to be detected.   Lipid Profile   Result Value Ref Range    Cholesterol 120 <200 mg/dL    Triglycerides 86 <150 mg/dL    Direct Measure HDL 42 >=40 mg/dL    LDL Cholesterol Calculated 61 <=100 mg/dL    Non HDL Cholesterol 78 <130 mg/dL    Narrative    Cholesterol  Desirable:  <200 mg/dL    Triglycerides  Normal:  Less than 150 mg/dL  Borderline High:  150-199 mg/dL  High:  200-499 mg/dL  Very High:  Greater than or equal to 500 mg/dL    Direct Measure HDL  Female:  Greater than or equal to 50 mg/dL   Male:  Greater than or equal to 40 mg/dL    LDL Cholesterol  Desirable:   <100mg/dL  Above Desirable:  100-129 mg/dL   Borderline High:  130-159 mg/dL   High:  160-189 mg/dL   Very High:  >= 190 mg/dL    Non HDL Cholesterol  Desirable:  130 mg/dL  Above Desirable:  130-159 mg/dL  Borderline High:  160-189 mg/dL  High:  190-219 mg/dL  Very High:  Greater than or equal to 220 mg/dL   Basic metabolic panel   Result Value Ref Range    Sodium 142 136 - 145 mmol/L    Potassium 4.3 3.4 - 5.3 mmol/L    Chloride 105 98 - 107 mmol/L    Carbon Dioxide (CO2) 27 22 - 29 mmol/L    Anion Gap 10 7 - 15 mmol/L    Urea Nitrogen 12.1 6.0 - 20.0 mg/dL    Creatinine 1.16 0.67 - 1.17 mg/dL    Calcium 9.5 8.6 - 10.0 mg/dL    Glucose 96 70 - 99 mg/dL    GFR Estimate 81 >60 mL/min/1.73m2      Comment:      eGFR calculated using 2021 CKD-EPI equation.   HIV Antigen Antibody Combo   Result Value Ref Range    HIV Antigen Antibody Combo Nonreactive Nonreactive      Comment:      HIV-1 p24 Ag & HIV-1/HIV-2 Ab Not Detected   Treponema Abs w Reflex to RPR and Titer   Result Value Ref Range    Treponema Antibody Total Nonreactive Nonreactive   Glucose whole blood   Result Value Ref Range    Glucose Whole Blood 109 (H) 60 - 99 mg/dL   Hepatitis C Screen Reflex to HCV RNA Quant and Genotype   Result Value Ref Range    Hepatitis C Antibody Nonreactive Nonreactive    Narrative    Assay performance characteristics have not been established for newborns, infants, and children.   Hemoglobin A1c   Result Value Ref Range    Hemoglobin A1C 6.4 (H) 0.0 - 5.6 %      Comment:      Normal <5.7%   Prediabetes 5.7-6.4%    Diabetes 6.5% or higher     Note: Adopted from ADA consensus guidelines.       If you have any questions or concerns, please call the clinic at the number listed above.       Sincerely,      Jerod Huang MD

## 2023-05-15 NOTE — PROGRESS NOTES
"Assessment/ Plan  Rash  Right volar forearm, began 3 months ago, would use working on a .  Has used hydrocortisone to some success but it keeps coming back.  Now problem came back, admits that he rubs it quite a bit, painful  Trial triamcinolone, avoid rubbing it,    Moderate persistent asthma without complication  Doing poorly, smoking  ACT is 13  Symptoms most nights wake him up, during the day as well  Start controller medicine, Symbicort, renewed albuterol, encouraged consideration of smoking cessation.  Patient is in the process treatment for methamphetamine and alcohol, recent slip ups.    Healthcare maintenance  Declines COVID-vaccine, believes he has had hepatitis B vaccines, pneumococcal given.  Discussed family history of cancer, only breast cancer in both grandmothers, no colon cancer-plan routine screening at 46  STD testing, encourage safe sexual practices.    Gastroesophageal reflux disease with esophagitis, unspecified whether hemorrhage  Very severe if he misses his medication, discussed next step of referral to gastroenterology, renewed omeprazole 40    Essential hypertension  Run out of medications and blood pressure is borderline.  History of diabetes when he was more overweight.  Will restart lisinopril, follow-up 2 months.    Erectile dysfunction, unspecified erectile dysfunction type  Patient's story shifting a little bit here, indicated that up to 2 x 100 mg pills of sildenafil was not always effective.  I offered to try Cialis but he declined, wanted more sildenafil instead, requesting at least 50/month of the 100  Top dose recommendation is 100 mg daily  I agreed only to this number per month.     Subjective  CC:  chief complaint  HPI:  42-year-old  Looking to establish primary care, get medications refilled  Currently in treatment for methamphetamine addiction and alcohol addiction.  Some recent \"slip ups\"    Also, previous incarceration.    Issues discussed above.  Discussed " also smoking, quit when he was incarcerated for a while but otherwise has not quit.  Primary goal currently is getting sober with meth and alcohol.  PFSH:  Patient Active Problem List   Diagnosis     Obesity     Closed nondisplaced fracture of shaft of fourth metacarpal bone of right hand     Healthcare maintenance     Erectile dysfunction, unspecified erectile dysfunction type     Gastroesophageal reflux disease with esophagitis, unspecified whether hemorrhage     Moderate persistent asthma without complication     Rash     Essential hypertension     albuterol (PROAIR HFA/PROVENTIL HFA/VENTOLIN HFA) 108 (90 Base) MCG/ACT inhaler, Inhale 2 puffs into the lungs every 6 hours as needed for shortness of breath, wheezing or cough  hydrOXYzine (ATARAX) 25 MG tablet, Take 1-2 tablets every 8 hours as needed for panic  traZODone (DESYREL) 100 MG tablet, Take 1.5 tablets by mouth daily  benzonatate (TESSALON) 100 MG capsule, Take 1 capsule (100 mg) by mouth 3 times daily as needed for cough (Patient not taking: Reported on 5/15/2023)  dextromethorphan (DELSYM) 30 MG/5ML liquid, Take 10 mLs (60 mg) by mouth 2 times daily (Patient not taking: Reported on 5/15/2023)  lisinopril (ZESTRIL) 10 MG tablet, Take 10 mg by mouth daily (Patient not taking: Reported on 5/15/2023)  metoclopramide (REGLAN) 10 MG tablet, Take 1 tablet (10 mg) by mouth 4 times daily as needed (nausea or headache) (Patient not taking: Reported on 5/15/2023)  predniSONE (DELTASONE) 20 MG tablet, Take two tablets (= 40mg) each day for 5 (five) days (Patient not taking: Reported on 5/15/2023)  QUEtiapine (SEROQUEL) 25 MG tablet, Take 1 tablet by mouth twice daily as needed for anxiety and 1-2 tablets at night for sleep (Patient not taking: Reported on 5/15/2023)  QUEtiapine (SEROQUEL) 25 MG tablet, Take 1 tablet by mouth 2 times daily (Patient not taking: Reported on 5/15/2023)  venlafaxine (EFFEXOR XR) 150 MG 24 hr capsule, Take 1 capsule by mouth daily  "(Patient not taking: Reported on 5/15/2023)    No current facility-administered medications on file prior to visit.       History   Smoking Status     Every Day     Types: Cigarettes   Smokeless Tobacco     Not on file     Social History     Social History Narrative     Not on file     Patient Care Team:  Mar Cisneros MD as Physician (Ophthalmology)  Gracia Sterling MD as Physician (Emergency Medicine)  Mar Cisneros MD as Assigned Surgical Provider  ROS  As above      Objective  Physical Exam  Vitals:    05/15/23 1329   BP: 137/86   BP Location: Left arm   Patient Position: Sitting   Cuff Size: Adult Regular   Pulse: 105   Resp: 18   Temp: 97.5  F (36.4  C)   SpO2: 99%   Weight: 94.8 kg (209 lb)   Height: 1.753 m (5' 9\")     Body mass index is 30.86 kg/m .  Gen- alert, oriented/ appropriately responsive  HEENT- normal cephalic, atraumatic.   Chest- Normal inspiration and expiration.  Congested sounding cough   Clear to ascultation.    No chest wall deformity or scar.  CV- Heart regular rate and rhythm  normal tones, no murmurs   No gallops or rubs.  Ext- appear well perfused, no edema  Skin- warm and dry,   no visualized rash    Diagnostics:   Pending      Please note: Voice recognition software was used in this dictation.  It may therefore contain typographical errors.        Answers for HPI/ROS submitted by the patient on 5/15/2023  What is the reason for your visit today? : med check and std testing  How many servings of fruits and vegetables do you eat daily?: 0-1  On average, how many sweetened beverages do you drink each day (Examples: soda, juice, sweet tea, etc.  Do NOT count diet or artificially sweetened beverages)?: 1  How many minutes a day do you exercise enough to make your heart beat faster?: 20 to 29  How many days a week do you exercise enough to make your heart beat faster?: 3 or less  How many days per week do you miss taking your medication?: 1      "

## 2023-05-15 NOTE — ASSESSMENT & PLAN NOTE
Declines COVID-vaccine, believes he has had hepatitis B vaccines, pneumococcal given.  Discussed family history of cancer, only breast cancer in both grandmothers, no colon cancer-plan routine screening at 46  STD testing, encourage safe sexual practices.

## 2023-05-16 LAB
ANION GAP SERPL CALCULATED.3IONS-SCNC: 10 MMOL/L (ref 7–15)
BUN SERPL-MCNC: 12.1 MG/DL (ref 6–20)
C TRACH DNA SPEC QL PROBE+SIG AMP: NEGATIVE
CALCIUM SERPL-MCNC: 9.5 MG/DL (ref 8.6–10)
CHLORIDE SERPL-SCNC: 105 MMOL/L (ref 98–107)
CHOLEST SERPL-MCNC: 120 MG/DL
CREAT SERPL-MCNC: 1.16 MG/DL (ref 0.67–1.17)
DEPRECATED HCO3 PLAS-SCNC: 27 MMOL/L (ref 22–29)
GFR SERPL CREATININE-BSD FRML MDRD: 81 ML/MIN/1.73M2
GLUCOSE SERPL-MCNC: 96 MG/DL (ref 70–99)
HCV AB SERPL QL IA: NONREACTIVE
HDLC SERPL-MCNC: 42 MG/DL
HIV 1+2 AB+HIV1 P24 AG SERPL QL IA: NONREACTIVE
LDLC SERPL CALC-MCNC: 61 MG/DL
N GONORRHOEA DNA SPEC QL NAA+PROBE: NEGATIVE
NONHDLC SERPL-MCNC: 78 MG/DL
POTASSIUM SERPL-SCNC: 4.3 MMOL/L (ref 3.4–5.3)
SODIUM SERPL-SCNC: 142 MMOL/L (ref 136–145)
T PALLIDUM AB SER QL: NONREACTIVE
TRIGL SERPL-MCNC: 86 MG/DL

## 2023-05-17 PROBLEM — R73.03 PREDIABETES: Status: ACTIVE | Noted: 2023-05-17

## 2023-08-20 DIAGNOSIS — N52.9 ERECTILE DYSFUNCTION, UNSPECIFIED ERECTILE DYSFUNCTION TYPE: ICD-10-CM

## 2023-08-20 RX ORDER — SILDENAFIL 100 MG/1
100 TABLET, FILM COATED ORAL DAILY
Qty: 30 TABLET | Refills: 8 | Status: SHIPPED | OUTPATIENT
Start: 2023-08-20

## 2023-08-20 NOTE — TELEPHONE ENCOUNTER
"Last Written Prescription Date:  5/15/2023  Last Fill Quantity: 30,  # refills: 1   Last office visit provider:  5/15/2023 with PCP Dr ÁLVARO Huang      Requested Prescriptions   Pending Prescriptions Disp Refills    sildenafil (VIAGRA) 100 MG tablet [Pharmacy Med Name: SILDENAFIL 100MG TABLETS] 30 tablet 1     Sig: TAKE 1 TABLET(100 MG) BY MOUTH DAILY       Erectile Dysfuction Protocol Passed - 8/20/2023 10:38 AM        Passed - Absence of nitrates on medication list        Passed - Absence of Alpha Blockers on Med list        Passed - Recent (12 mo) or future (30 days) visit within the authorizing provider's specialty     Patient has had an office visit with the authorizing provider or a provider within the authorizing providers department within the previous 12 mos or has a future within next 30 days. See \"Patient Info\" tab in inbasket, or \"Choose Columns\" in Meds & Orders section of the refill encounter.              Passed - Medication is active on med list        Passed - Patient is age 18 or older             Doris Otero RN 08/20/23 3:58 PM  "

## 2024-05-16 DIAGNOSIS — K21.00 GASTROESOPHAGEAL REFLUX DISEASE WITH ESOPHAGITIS, UNSPECIFIED WHETHER HEMORRHAGE: ICD-10-CM

## 2024-05-16 RX ORDER — OMEPRAZOLE 40 MG/1
40 CAPSULE, DELAYED RELEASE ORAL DAILY
Qty: 90 CAPSULE | Refills: 3 | Status: SHIPPED | OUTPATIENT
Start: 2024-05-16

## 2025-01-06 DIAGNOSIS — N52.9 ERECTILE DYSFUNCTION, UNSPECIFIED ERECTILE DYSFUNCTION TYPE: ICD-10-CM

## 2025-01-07 RX ORDER — SILDENAFIL 100 MG/1
100 TABLET, FILM COATED ORAL DAILY
Qty: 30 TABLET | Refills: 8 | Status: SHIPPED | OUTPATIENT
Start: 2025-01-07

## 2025-01-20 DIAGNOSIS — J45.40 MODERATE PERSISTENT ASTHMA WITHOUT COMPLICATION: ICD-10-CM

## 2025-01-21 ENCOUNTER — HOSPITAL ENCOUNTER (EMERGENCY)
Facility: HOSPITAL | Age: 45
Discharge: HOME | End: 2025-01-21
Attending: STUDENT IN AN ORGANIZED HEALTH CARE EDUCATION/TRAINING PROGRAM | Admitting: STUDENT IN AN ORGANIZED HEALTH CARE EDUCATION/TRAINING PROGRAM
Payer: MEDICAID

## 2025-01-21 VITALS
RESPIRATION RATE: 17 BRPM | WEIGHT: 210 LBS | DIASTOLIC BLOOD PRESSURE: 87 MMHG | OXYGEN SATURATION: 97 % | BODY MASS INDEX: 31.1 KG/M2 | HEIGHT: 69 IN | TEMPERATURE: 98.3 F | HEART RATE: 88 BPM | SYSTOLIC BLOOD PRESSURE: 136 MMHG

## 2025-01-21 DIAGNOSIS — K21.00 GASTROESOPHAGEAL REFLUX DISEASE WITH ESOPHAGITIS, UNSPECIFIED WHETHER HEMORRHAGE: ICD-10-CM

## 2025-01-21 DIAGNOSIS — J45.21 MILD INTERMITTENT ASTHMA WITH EXACERBATION: ICD-10-CM

## 2025-01-21 PROCEDURE — 94640 AIRWAY INHALATION TREATMENT: CPT

## 2025-01-21 PROCEDURE — 99284 EMERGENCY DEPT VISIT MOD MDM: CPT | Mod: 25

## 2025-01-21 PROCEDURE — 250N000012 HC RX MED GY IP 250 OP 636 PS 637: Performed by: STUDENT IN AN ORGANIZED HEALTH CARE EDUCATION/TRAINING PROGRAM

## 2025-01-21 PROCEDURE — 250N000009 HC RX 250: Performed by: STUDENT IN AN ORGANIZED HEALTH CARE EDUCATION/TRAINING PROGRAM

## 2025-01-21 PROCEDURE — 250N000013 HC RX MED GY IP 250 OP 250 PS 637: Performed by: STUDENT IN AN ORGANIZED HEALTH CARE EDUCATION/TRAINING PROGRAM

## 2025-01-21 RX ORDER — ACETAMINOPHEN 325 MG/1
975 TABLET ORAL ONCE
Status: COMPLETED | OUTPATIENT
Start: 2025-01-21 | End: 2025-01-21

## 2025-01-21 RX ORDER — MAGNESIUM HYDROXIDE/ALUMINUM HYDROXICE/SIMETHICONE 120; 1200; 1200 MG/30ML; MG/30ML; MG/30ML
15 SUSPENSION ORAL ONCE
Status: COMPLETED | OUTPATIENT
Start: 2025-01-21 | End: 2025-01-21

## 2025-01-21 RX ORDER — PREDNISONE 20 MG/1
60 TABLET ORAL ONCE
Status: COMPLETED | OUTPATIENT
Start: 2025-01-21 | End: 2025-01-21

## 2025-01-21 RX ORDER — OMEPRAZOLE 40 MG/1
40 CAPSULE, DELAYED RELEASE ORAL DAILY
Qty: 90 CAPSULE | Refills: 3 | Status: SHIPPED | OUTPATIENT
Start: 2025-01-21

## 2025-01-21 RX ORDER — PREDNISONE 20 MG/1
TABLET ORAL
Qty: 10 TABLET | Refills: 0 | Status: SHIPPED | OUTPATIENT
Start: 2025-01-21

## 2025-01-21 RX ORDER — ALBUTEROL SULFATE 90 UG/1
2 INHALANT RESPIRATORY (INHALATION) EVERY 6 HOURS PRN
Status: DISCONTINUED | OUTPATIENT
Start: 2025-01-21 | End: 2025-01-21 | Stop reason: HOSPADM

## 2025-01-21 RX ORDER — LIDOCAINE HYDROCHLORIDE 20 MG/ML
5 SOLUTION OROPHARYNGEAL ONCE
Status: COMPLETED | OUTPATIENT
Start: 2025-01-21 | End: 2025-01-21

## 2025-01-21 RX ORDER — FAMOTIDINE 20 MG/1
20 TABLET, FILM COATED ORAL ONCE
Status: COMPLETED | OUTPATIENT
Start: 2025-01-21 | End: 2025-01-21

## 2025-01-21 RX ADMIN — FAMOTIDINE 20 MG: 20 TABLET, FILM COATED ORAL at 03:12

## 2025-01-21 RX ADMIN — PREDNISONE 60 MG: 20 TABLET ORAL at 03:12

## 2025-01-21 RX ADMIN — ACETAMINOPHEN 975 MG: 325 TABLET ORAL at 03:12

## 2025-01-21 RX ADMIN — ALBUTEROL SULFATE 2 PUFF: 90 AEROSOL, METERED RESPIRATORY (INHALATION) at 03:08

## 2025-01-21 RX ADMIN — LIDOCAINE HYDROCHLORIDE 5 ML: 20 SOLUTION ORAL at 03:11

## 2025-01-21 RX ADMIN — ALUMINUM HYDROXIDE, MAGNESIUM HYDROXIDE, AND DIMETHICONE 15 ML: 200; 20; 200 SUSPENSION ORAL at 03:12

## 2025-01-21 ASSESSMENT — COLUMBIA-SUICIDE SEVERITY RATING SCALE - C-SSRS
1. IN THE PAST MONTH, HAVE YOU WISHED YOU WERE DEAD OR WISHED YOU COULD GO TO SLEEP AND NOT WAKE UP?: NO
2. HAVE YOU ACTUALLY HAD ANY THOUGHTS OF KILLING YOURSELF IN THE PAST MONTH?: NO
6. HAVE YOU EVER DONE ANYTHING, STARTED TO DO ANYTHING, OR PREPARED TO DO ANYTHING TO END YOUR LIFE?: NO

## 2025-01-21 ASSESSMENT — ACTIVITIES OF DAILY LIVING (ADL): ADLS_ACUITY_SCORE: 41

## 2025-01-21 NOTE — ED PROVIDER NOTES
EMERGENCY DEPARTMENT ENCOUNTER      NAME: Quinn Cohen  AGE: 44 year old male  YOB: 1980  MRN: [id number]  EVALUATION DATE & TIME: No admission date for patient encounter.    PCP: No primary care provider on file.    ED PROVIDER: Robbie Banegas MD      Chief Complaint   Patient presents with    Shortness of Breath    Cough         FINAL IMPRESSION:  1. Mild intermittent asthma with exacerbation    2. Gastroesophageal reflux disease with esophagitis, unspecified whether hemorrhage          ED COURSE & MEDICAL DECISION MAKING:    Pertinent Labs & Imaging studies reviewed. (See chart for details)  44 year old male presents to the Emergency Department for evaluation of SOB, cough    ED Course as of 01/21/25 0344   Tue Jan 21, 2025   0304 Patient is a 44-year-old male with a past medical history significant for GERD, asthma, who presents the emergency department with shortness of breath, cough, nasal congestion for the past several days.  The patient was recently released from shelter and is out of his medications so he does not have his inhaler.  He is complaining of headache, indigestion pain, and shortness of breath.  He is in no acute distress, without hypoxia or tachycardia or fever or hypotension.  Will treat his asthma exacerbation with albuterol inhaler, steroids.  Will treat his headache with Tylenol.  Will treat his GERD with Pepcid, GI cocktail.  Then will reassess.  Given the short duration of symptoms, low suspicion of pneumonia do not think x-rays indicated.  Viral swabs will not , so these are not obtained.  Patient is young, otherwise healthy, in no acute distress, so do not feel that lab work is indicated.  Similarly, low suspicion of ACS as he has wheezing on exam and asthma exacerbation is the likely etiology of his shortness of breath.   0342 Patient is feeling better at this time, will discharge with return precautions.  He had asked that we prescribed his home  medications including omeprazole 40 mg twice daily, metformin 500 mg every day, albuterol inhaler, and we will also prescribe him a prednisone burst for asthma exacerbation.  Patient is a primary care appointment early next month.       Medical Decision Making  Obtained supplemental history:Supplemental history obtained?: Documented in chart and Family Member/Significant Other  Reviewed external records: External records reviewed?: Documented in chart  Care impacted by chronic illness:Alcohol and/or Drug Abuse or Dependence, Diabetes, Hypertension, Mental Health, and Other: asthma, GERD  Care significantly affected by social determinants of health:Alcohol Abuse and/or Recreational Drug Use  Did you consider but not order tests?: Work up considered but not performed and documented in chart, if applicable  Did you interpret images independently?: Independent interpretation of ECG and images noted in documentation, when applicable.  Consultation discussion with other provider:Did you involve another provider (consultant, , pharmacy, etc.)?: No  Discharge. I prescribed additional prescription strength medication(s) as charted. See documentation for any additional details.  Asthma or COPD Exacerbation:Smoking Cessation Counseling: Patient is a current smoker and received smoking cessation instructions/education at discharge.       At the conclusion of the encounter I discussed the results of all of the tests and the disposition. The questions were answered. The patient or family acknowledged understanding and was agreeable with the care plan.     0 minutes of critical care time     MEDICATIONS GIVEN IN THE EMERGENCY:  Medications   albuterol (PROVENTIL HFA/VENTOLIN HFA) inhaler (2 puffs Inhalation $Given 1/21/25 0308)   predniSONE (DELTASONE) tablet 60 mg (60 mg Oral $Given 1/21/25 0312)   alum & mag hydroxide-simethicone (MAALOX) suspension 15 mL (15 mLs Oral $Given 1/21/25 0312)   lidocaine (viscous) (XYLOCAINE) 2 %  solution 5 mL (5 mLs Swish & Swallow $Given 1/21/25 0311)   famotidine (PEPCID) tablet 20 mg (20 mg Oral $Given 1/21/25 0312)   acetaminophen (TYLENOL) tablet 975 mg (975 mg Oral $Given 1/21/25 0312)       NEW PRESCRIPTIONS STARTED AT TODAY'S ER VISIT  New Prescriptions    METFORMIN (GLUCOPHAGE) 500 MG TABLET    Take 1 tablet (500 mg) by mouth daily (with breakfast).    PREDNISONE (DELTASONE) 20 MG TABLET    Take two tablets (= 40mg) each day for 5 (five) days          =================================================================    HPI    Patient information was obtained from: patient    Use of : N/A       Quinn BRIE Cohen is a 44 year old male with a pertinent history of asthma, hypertension, anxiety, who presents to this ED via walk-in for evaluation of shortness of breath.    The patient has a history of asthma, but has no inhaler currently. For 2-3 days now, the patient has had chest tightness with a dry cough and congestion. He also notes headache, but denies fever. He presents to the ER for evaluation of his continued symptoms.    Pt has not been intubated or in ICU for his asthma before.      PAST MEDICAL HISTORY:  History reviewed. No pertinent past medical history.    PAST SURGICAL HISTORY:  Past Surgical History:   Procedure Laterality Date    HERNIA REPAIR             CURRENT MEDICATIONS:    predniSONE (DELTASONE) 20 MG tablet  albuterol (PROAIR HFA/PROVENTIL HFA/VENTOLIN HFA) 108 (90 Base) MCG/ACT inhaler  benzonatate (TESSALON) 100 MG capsule  budesonide-formoterol (SYMBICORT) 80-4.5 MCG/ACT Inhaler  dextromethorphan (DELSYM) 30 MG/5ML liquid  hydrOXYzine (ATARAX) 25 MG tablet  lisinopril (ZESTRIL) 10 MG tablet  lisinopril (ZESTRIL) 10 MG tablet  metFORMIN (GLUCOPHAGE) 500 MG tablet  metoclopramide (REGLAN) 10 MG tablet  omeprazole (PRILOSEC) 40 MG DR capsule  QUEtiapine (SEROQUEL) 25 MG tablet  QUEtiapine (SEROQUEL) 25 MG tablet  sildenafil (VIAGRA) 100 MG tablet  traZODone (DESYREL) 100  "MG tablet  triamcinolone (KENALOG) 0.1 % external cream  venlafaxine (EFFEXOR XR) 150 MG 24 hr capsule  VENTOLIN  (90 Base) MCG/ACT inhaler        ALLERGIES:  No Known Allergies    FAMILY HISTORY:  History reviewed. No pertinent family history.    SOCIAL HISTORY:   Social History     Socioeconomic History    Marital status: Single   Tobacco Use    Smoking status: Every Day     Types: Cigarettes   Substance and Sexual Activity    Alcohol use: Yes     Comment: drinks 12 shots of vodka/daily    Drug use: No     Social Drivers of Health      [facility] [employer]    Financial Resource Strain    [facility] [employer]    Social Connections       VITALS:  BP (!) 148/88   Pulse 82   Temp 98.3  F (36.8  C) (Oral)   Resp 16   Ht 1.753 m (5' 9\")   Wt 95.3 kg (210 lb)   SpO2 97%   BMI 31.01 kg/m      PHYSICAL EXAM    Physical Exam  Vitals and nursing note reviewed.   Constitutional:       General: He is not in acute distress.     Appearance: Normal appearance. He is normal weight. He is not ill-appearing.   HENT:      Head: Normocephalic and atraumatic.      Nose: Nose normal.      Mouth/Throat:      Mouth: Mucous membranes are moist.      Pharynx: Oropharynx is clear.   Eyes:      Extraocular Movements: Extraocular movements intact.      Conjunctiva/sclera: Conjunctivae normal.   Cardiovascular:      Rate and Rhythm: Normal rate and regular rhythm.      Pulses: Normal pulses.      Heart sounds: Normal heart sounds. No murmur heard.  Pulmonary:      Effort: Pulmonary effort is normal. No respiratory distress.      Breath sounds: Wheezing present.   Abdominal:      General: Abdomen is flat. There is no distension.      Palpations: Abdomen is soft.      Tenderness: There is no abdominal tenderness.   Musculoskeletal:         General: Normal range of " motion.      Cervical back: Normal range of motion.      Right lower leg: No edema.      Left lower leg: No edema.   Skin:     General: Skin is warm and dry.      Capillary Refill: Capillary refill takes less than 2 seconds.   Neurological:      General: No focal deficit present.      Mental Status: He is alert and oriented to person, place, and time. Mental status is at baseline.   Psychiatric:         Mood and Affect: Mood normal.         Behavior: Behavior normal.         Thought Content: Thought content normal.         Judgment: Judgment normal.            LAB:  All pertinent labs reviewed and interpreted.       RADIOLOGY:  Reviewed all pertinent imaging. Please see official radiology report.  No orders to display       PROCEDURES:   None      [facility] Documentation:   CMS Diagnoses:              I, Jarrett Bueno, am serving as a scribe to document services personally performed by Robbie Banegas MD based on my observation and the provider's statements to me. I, Robbie Banegas MD, attest that Jarrett Bueno is acting in a scribe capacity, has observed my performance of the services and has documented them in accordance with my direction.    Robbie Banegas MD  [facility] EMERGENCY DEPARTMENT  [address] [place] [id number]  [phone]       Robbie Banegas MD  01/21/25 0348

## 2025-01-21 NOTE — ED TRIAGE NOTES
Pt reports hx of asthma. SOB started 3 days ago. Pt was just released from residential on January 2nd. Pt is a smoker. Pt reports tightness in his chest. Does not have any of his inhalers currently.

## 2025-01-22 RX ORDER — ALBUTEROL SULFATE 90 UG/1
INHALANT RESPIRATORY (INHALATION)
Qty: 18 G | Refills: 11 | Status: SHIPPED | OUTPATIENT
Start: 2025-01-22

## 2025-02-07 ENCOUNTER — APPOINTMENT (OUTPATIENT)
Dept: RADIOLOGY | Facility: HOSPITAL | Age: 45
End: 2025-02-07
Attending: EMERGENCY MEDICINE
Payer: MEDICAID

## 2025-02-07 ENCOUNTER — HOSPITAL ENCOUNTER (EMERGENCY)
Facility: HOSPITAL | Age: 45
Discharge: HOME | End: 2025-02-07
Attending: EMERGENCY MEDICINE | Admitting: EMERGENCY MEDICINE
Payer: MEDICAID

## 2025-02-07 VITALS
SYSTOLIC BLOOD PRESSURE: 152 MMHG | WEIGHT: 230 LBS | TEMPERATURE: 98.5 F | HEART RATE: 88 BPM | DIASTOLIC BLOOD PRESSURE: 93 MMHG | OXYGEN SATURATION: 99 % | RESPIRATION RATE: 18 BRPM | HEIGHT: 69 IN | BODY MASS INDEX: 34.07 KG/M2

## 2025-02-07 DIAGNOSIS — E11.9 TYPE 2 DIABETES MELLITUS WITHOUT COMPLICATION, UNSPECIFIED WHETHER LONG TERM INSULIN USE (H): ICD-10-CM

## 2025-02-07 DIAGNOSIS — J45.909 ACUTE ASTHMA: ICD-10-CM

## 2025-02-07 LAB
ANION GAP SERPL CALCULATED.3IONS-SCNC: 9 MMOL/L (ref 7–15)
BUN SERPL-MCNC: 17.1 MG/DL (ref 6–20)
CALCIUM SERPL-MCNC: 9.3 MG/DL (ref 8.8–10.4)
CHLORIDE SERPL-SCNC: 105 MMOL/L (ref 98–107)
CREAT SERPL-MCNC: 1.08 MG/DL (ref 0.67–1.17)
EGFRCR SERPLBLD CKD-EPI 2021: 87 ML/MIN/1.73M2
FLUAV RNA SPEC QL NAA+PROBE: NEGATIVE
FLUBV RNA RESP QL NAA+PROBE: NEGATIVE
GLUCOSE SERPL-MCNC: 159 MG/DL (ref 70–99)
HCO3 SERPL-SCNC: 29 MMOL/L (ref 22–29)
POTASSIUM SERPL-SCNC: 4.3 MMOL/L (ref 3.4–5.3)
RSV RNA SPEC NAA+PROBE: NEGATIVE
SARS-COV-2 RNA RESP QL NAA+PROBE: NEGATIVE
SODIUM SERPL-SCNC: 143 MMOL/L (ref 135–145)

## 2025-02-07 PROCEDURE — 36415 COLL VENOUS BLD VENIPUNCTURE: CPT | Performed by: EMERGENCY MEDICINE

## 2025-02-07 PROCEDURE — 250N000009 HC RX 250: Performed by: EMERGENCY MEDICINE

## 2025-02-07 PROCEDURE — 94640 AIRWAY INHALATION TREATMENT: CPT

## 2025-02-07 PROCEDURE — 87637 SARSCOV2&INF A&B&RSV AMP PRB: CPT | Performed by: EMERGENCY MEDICINE

## 2025-02-07 PROCEDURE — 71046 X-RAY EXAM CHEST 2 VIEWS: CPT

## 2025-02-07 PROCEDURE — 93005 ELECTROCARDIOGRAM TRACING: CPT | Performed by: EMERGENCY MEDICINE

## 2025-02-07 PROCEDURE — 93005 ELECTROCARDIOGRAM TRACING: CPT | Performed by: STUDENT IN AN ORGANIZED HEALTH CARE EDUCATION/TRAINING PROGRAM

## 2025-02-07 PROCEDURE — 99285 EMERGENCY DEPT VISIT HI MDM: CPT | Mod: 25

## 2025-02-07 PROCEDURE — 80048 BASIC METABOLIC PNL TOTAL CA: CPT | Performed by: EMERGENCY MEDICINE

## 2025-02-07 RX ORDER — PREDNISONE 20 MG/1
TABLET ORAL
Qty: 10 TABLET | Refills: 0 | Status: SHIPPED | OUTPATIENT
Start: 2025-02-07

## 2025-02-07 RX ORDER — ALBUTEROL SULFATE 90 UG/1
2 INHALANT RESPIRATORY (INHALATION) EVERY 6 HOURS PRN
Qty: 18 G | Refills: 0 | Status: SHIPPED | OUTPATIENT
Start: 2025-02-07

## 2025-02-07 RX ORDER — IPRATROPIUM BROMIDE AND ALBUTEROL SULFATE 2.5; .5 MG/3ML; MG/3ML
3 SOLUTION RESPIRATORY (INHALATION) ONCE
Status: COMPLETED | OUTPATIENT
Start: 2025-02-07 | End: 2025-02-07

## 2025-02-07 RX ADMIN — IPRATROPIUM BROMIDE AND ALBUTEROL SULFATE 3 ML: .5; 3 SOLUTION RESPIRATORY (INHALATION) at 12:28

## 2025-02-07 ASSESSMENT — ENCOUNTER SYMPTOMS
CHILLS: 0
COUGH: 1
SORE THROAT: 0
FEVER: 0
HEADACHES: 1
SHORTNESS OF BREATH: 1
ABDOMINAL PAIN: 0
VOMITING: 0

## 2025-02-07 ASSESSMENT — COLUMBIA-SUICIDE SEVERITY RATING SCALE - C-SSRS
1. IN THE PAST MONTH, HAVE YOU WISHED YOU WERE DEAD OR WISHED YOU COULD GO TO SLEEP AND NOT WAKE UP?: NO
6. HAVE YOU EVER DONE ANYTHING, STARTED TO DO ANYTHING, OR PREPARED TO DO ANYTHING TO END YOUR LIFE?: NO
2. HAVE YOU ACTUALLY HAD ANY THOUGHTS OF KILLING YOURSELF IN THE PAST MONTH?: NO

## 2025-02-07 NOTE — DISCHARGE INSTRUCTIONS
You should monitor your blood sugars while you are on prednisone as they will likely run higher than they would normally.  Make sure you are staying well-hydrated as well.

## 2025-02-07 NOTE — ED TRIAGE NOTES
Patient ambulates to triage.  Hx asthma. Started with a cough 4 days ago and has been using his inhaler multiple times throughout the day.  Increasing shortness of breath.

## 2025-02-07 NOTE — ED PROVIDER NOTES
Emergency Department Encounter      NAME: Quinn Cohen  AGE: 44 year old male  YOB: 1980  MRN: [id number]  EVALUATION DATE & TIME: No admission date for patient encounter.    PCP: No Ref-Primary, Physician    ED PROVIDER: Robbie Gonzalez M.D.      Chief Complaint   Patient presents with    Cough    Shortness of Breath         FINAL IMPRESSION:  1. Type 2 diabetes mellitus without complication, unspecified whether long term insulin use (H)    2. Acute asthma        MEDICAL DECISION MAKIN:50 AM I met with the patient, obtained history, performed an initial exam, and discussed options and plan for diagnostics and treatment here in the ED.   2:13 PM We discussed the plan for discharge and the patient is agreeable. Reviewed supportive cares, symptomatic treatment, outpatient follow up, and reasons to return to the Emergency Department. Patient to be discharged by ED RN.       This patient is a 44-year-old male who presents with flu symptoms.  He has a history of asthma and says that he got out of [facility] 4 weeks ago.  He has been using his albuterol inhaler a couple times a day for the past 3 days because of shortness of breath.  He is concerned that he might have RSV as there is another person at his house who tested positive for RSV.  He has been having a dry cough, headache and myalgias.  He also says that he has run out of his metformin that he uses for his type 2 diabetes.  Because of the patient's shortness of breath a EKG was done in triage and it did not show any evidence of ST elevation MI or ischemic changes.  A chest x-ray was done and did not show any evidence of pneumonia.  I independently reviewed and interpreted the chest x-ray.  The patient's lab work included negative influenza, COVID and RSV test.  Additionally I checked a basic metabolic profile on him because of his diabetes that is not being treated.  His random glucose level is 159.  I discussed with him that I am going to  put him on a short course of prednisone for the shortness of breath and asthma.  He is aware that the prednisone is going to make his blood sugars run high.  I also refilled his metformin prescription and he is going to see his doctor in 3 days.  He requested nebulizer but as his symptoms are very mild I encouraged him to discuss this with his primary care doctor.    Pertinent Labs & Imaging studies reviewed. (See chart for details)    The importance of close follow up was discussed. We reviewed warning signs and symptoms, and I instructed Mr. Cohen to return to the emergency department immediately if he develops any new or worsening symptoms. I provided additional verbal discharge instructions. Mr. Cohen expressed understanding and agreement with this plan of care, his questions were answered, and he was discharged in stable condition.     Medical Decision Making     History:  Supplemental history from: Documented in chart, if applicable  External Record(s) reviewed: Documented in chart, if applicable.     Work Up:  Chart documentation includes differential considered and any EKGs or imaging independently interpreted by provider, where specified.  In additional to work up documented, I considered the following work up: Documented in chart, if applicable.     External consultation:  Discussion of management with another provider: Documented in chart, if applicable     Complicating factors:  Care impacted by chronic illness: GERD, asthma, type 2 diabetes mellitus, hypertension  Care affected by social determinants of health: Access to Medical Care     Disposition considerations: Discharge      MEDICATIONS GIVEN IN THE EMERGENCY:  Medications   ipratropium - albuterol 0.5 mg/2.5 mg/3 mL (DUONEB) neb solution 3 mL (3 mLs Nebulization $Given 2/7/25 1221)       NEW PRESCRIPTIONS STARTED AT TODAY'S ER VISIT:  Discharge Medication List as of 2/7/2025  2:03 PM          "    =================================================================    HPI    Patient information was obtained from: Patient    Use of : N/A         Quinn BRIE Cohen is a 44 year old male with a past medical history of GERD, asthma, type 2 diabetes mellitus, hypertension, who presents for shortness of breath.    Per chart review, 01/21/2025 [facility] Emergency Department visit for shortness of breath, cough, nasal congestion, headache, indigestion pain for a few days prior. He was recently released from alf and was out of his medications so he did not have his inhaler. GERD treated with GI cocktail. Exam showed no acute distress, without hypoxia or tachycardia or fever or hypotension. Wheezing and asthma exacerbation was the likely etiology of his shortness of breath. No suspicion of pneumonia with no x-ray indicated. Patient discharged with prescribed omeprazole 40 mg 2x daily, metformin 500 mg daily, albuterol inhaler, and prednisone burst for asthma exacerbation. He reported a primary care appointment early next month.    Due to insurance issues, he denies taking any of his prescribed medications including metformin 500 mg 2x daily since his alf discharge around one month ago. He was able to get insurance this past week and has a plan to receive his medications. For the past 3 days (02/03/2025) he has endorsed increased shortness of breath and trouble breathing similar to prior asthma exacerbations. He has taken his albuterol inhaler above baseline \"more than he should be\" every 2 hours. He has further endorsed headache and dry cough. He does note an older gentleman he lives with has RSV. He denies fever, chills, vomiting, sore throat, abdominal pain, leg swelling, or any other symptoms at this time.    He reports an upcoming primary care physician appointment in 3 days (02/10/2025). He plans to go over and revise his medications during this time.      REVIEW OF SYSTEMS   Review of " Systems   Constitutional:  Negative for chills and fever.   HENT:  Negative for sore throat.    Respiratory:  Positive for cough and shortness of breath.    Cardiovascular:  Negative for leg swelling.   Gastrointestinal:  Negative for abdominal pain and vomiting.   Neurological:  Positive for headaches.   All other systems reviewed and are negative.         PAST MEDICAL HISTORY:  No past medical history on file.    PAST SURGICAL HISTORY:  Past Surgical History:   Procedure Laterality Date    HERNIA REPAIR         CURRENT MEDICATIONS:    No current facility-administered medications for this encounter.    Current Outpatient Medications:     albuterol (PROAIR HFA/PROVENTIL HFA/VENTOLIN HFA) 108 (90 Base) MCG/ACT inhaler, Inhale 2 puffs into the lungs every 6 hours as needed for shortness of breath, wheezing or cough., Disp: 18 g, Rfl: 0    metFORMIN (GLUCOPHAGE) 500 MG tablet, Take 1 tablet (500 mg) by mouth 2 times daily (with meals)., Disp: 60 tablet, Rfl: 0    predniSONE (DELTASONE) 20 MG tablet, Take two tablets (= 40mg) each day for 5 (five) days, Disp: 10 tablet, Rfl: 0    benzonatate (TESSALON) 100 MG capsule, Take 1 capsule (100 mg) by mouth 3 times daily as needed for cough (Patient not taking: Reported on 5/15/2023), Disp: 20 capsule, Rfl: 0    budesonide-formoterol (SYMBICORT) 80-4.5 MCG/ACT Inhaler, Inhale 2 puffs into the lungs 2 times daily, Disp: 10.2 g, Rfl: 3    dextromethorphan (DELSYM) 30 MG/5ML liquid, Take 10 mLs (60 mg) by mouth 2 times daily (Patient not taking: Reported on 5/15/2023), Disp: 89 mL, Rfl: 0    hydrOXYzine (ATARAX) 25 MG tablet, Take 1-2 tablets every 8 hours as needed for panic, Disp: 60 tablet, Rfl: 0    lisinopril (ZESTRIL) 10 MG tablet, Take 1 tablet (10 mg) by mouth daily, Disp: 90 tablet, Rfl: 3    lisinopril (ZESTRIL) 10 MG tablet, Take 10 mg by mouth daily (Patient not taking: Reported on 5/15/2023), Disp: , Rfl:     metoclopramide (REGLAN) 10 MG tablet, Take 1 tablet (10  "mg) by mouth 4 times daily as needed (nausea or headache) (Patient not taking: Reported on 5/15/2023), Disp: 12 tablet, Rfl: 0    omeprazole (PRILOSEC) 40 MG DR capsule, Take 1 capsule (40 mg) by mouth daily., Disp: 90 capsule, Rfl: 3    QUEtiapine (SEROQUEL) 25 MG tablet, Take 1 tablet by mouth twice daily as needed for anxiety and 1-2 tablets at night for sleep (Patient not taking: Reported on 5/15/2023), Disp: 90 tablet, Rfl: 0    QUEtiapine (SEROQUEL) 25 MG tablet, Take 1 tablet by mouth 2 times daily (Patient not taking: Reported on 5/15/2023), Disp: , Rfl:     sildenafil (VIAGRA) 100 MG tablet, TAKE 1 TABLET(100 MG) BY MOUTH DAILY, Disp: 30 tablet, Rfl: 8    traZODone (DESYREL) 100 MG tablet, Take 1.5 tablets by mouth daily, Disp: , Rfl:     triamcinolone (KENALOG) 0.1 % external cream, Apply topically 2 times daily, Disp: 80 g, Rfl: 3    venlafaxine (EFFEXOR XR) 150 MG 24 hr capsule, Take 1 capsule by mouth daily (Patient not taking: Reported on 5/15/2023), Disp: , Rfl:     ALLERGIES:  No Known Allergies    FAMILY HISTORY:  No family history on file.    SOCIAL HISTORY:   Social History     Socioeconomic History    Marital status: Single   Tobacco Use    Smoking status: Every Day     Types: Cigarettes   Substance and Sexual Activity    Alcohol use: Yes     Comment: drinks 12 shots of vodka/daily    Drug use: No     Social Drivers of Health      Received from [employer], [email or web address] [facility]    Financial Resource Strain    [facility]    Social Connections       PHYSICAL EXAM:    Vitals: BP (!) 152/93   Pulse 88   Temp 98.5  F (36.9  C) (Oral)   Resp 18   Ht 1.753 m (5' 9\")   Wt 104.3 kg (230 lb)   SpO2 99%   BMI 33.97 kg/m     Constitutional: Well developed, well nourished. Comfortable appearing. White heavy male with frequent course " cough.  HEAD:Normocephalic, atraumatic,   Eyes: PERRLA, EOM intact, conjunctiva clear, no discharge  ENT: mucous membranes moist, nose normal.   Neck- Supple, gross ROM intact.  No JVD.  No palpable nodes.  Pulmonary: Faint right anterior wheeze heard. Moving air well. No respiratory distress, speaks full sentences easily.  Chest: No chest wall tenderness  Cardiovascular: Normal heart rate, regular rhythm, no murmurs. No lower extremity edema, 2+ DP pulses.   GI: Soft, no tenderness to deep palpation in all quadrants, not distended, no masses.  No hepatosplenomegaly.  Musculoskeletal: No calf tenderness or edema. Moving all 4 extremities intentionally and without pain. No obvious deformity.  Back: No CVA tenderness  Skin: Warm, dry, no rash.  Neurologic: Alert & oriented x 3, speech clear, moving all extremities spontaneously   Psychiatric: Affect normal, cooperative.     LAB:  All pertinent labs reviewed and interpreted.  Labs Ordered and Resulted from Time of ED Arrival to Time of ED Departure   BASIC METABOLIC PANEL - Abnormal       Result Value    Sodium 143      Potassium 4.3      Chloride 105      Carbon Dioxide (CO2) 29      Anion Gap 9      Urea Nitrogen 17.1      Creatinine 1.08      GFR Estimate 87      Calcium 9.3      Glucose 159 (*)    INFLUENZA A/B, RSV AND SARS-COV2 PCR - Normal    Influenza A PCR Negative      Influenza B PCR Negative      RSV PCR Negative      SARS CoV2 PCR Negative         RADIOLOGY:  XR Chest 2 Views   Final Result   IMPRESSION: Negative chest.          EKG:   Performed at: 11:10:18  Impression: Sinus rhythm. Normal ECG.   Rate: 99  Rhythm: Sinus rhythm.  QRS Interval: 90  QTc Interval: 431  Comparison: No previous ECGs available  I have independently reviewed and interpreted the EKG(s) documented above.         I, Fransico Pisano, am serving as a scribe to document services personally performed by Dr. Robbie Gonzalez based on my observation and the provider's statements to me. I,  Robbie Gonzalez M.D. attest that Fransico Pisano is acting in a scribe capacity, has observed my performance of the services and has documented them in accordance with my direction.      Robbie Gonzalez M.D.  Emergency Medicine  [facility] EMERGENCY DEPARTMENT  [address] [place] [id number]  [phone]  Dept: [phone]      Robbie Gonzalez MD  02/07/25 8246

## 2025-02-10 LAB
ATRIAL RATE - MUSE: 99 BPM
DIASTOLIC BLOOD PRESSURE - MUSE: NORMAL MMHG
INTERPRETATION ECG - MUSE: NORMAL
P AXIS - MUSE: 63 DEGREES
PR INTERVAL - MUSE: 184 MS
QRS DURATION - MUSE: 90 MS
QT - MUSE: 336 MS
QTC - MUSE: 431 MS
R AXIS - MUSE: 9 DEGREES
SYSTOLIC BLOOD PRESSURE - MUSE: NORMAL MMHG
T AXIS - MUSE: 47 DEGREES
VENTRICULAR RATE- MUSE: 99 BPM

## 2025-02-20 ENCOUNTER — APPOINTMENT (OUTPATIENT)
Dept: CT IMAGING | Facility: HOSPITAL | Age: 45
End: 2025-02-20
Attending: EMERGENCY MEDICINE
Payer: MEDICAID

## 2025-02-20 ENCOUNTER — HOSPITAL ENCOUNTER (EMERGENCY)
Facility: HOSPITAL | Age: 45
Discharge: HOME | End: 2025-02-20
Attending: EMERGENCY MEDICINE
Payer: MEDICAID

## 2025-02-20 VITALS
HEART RATE: 53 BPM | OXYGEN SATURATION: 97 % | WEIGHT: 240.3 LBS | HEIGHT: 70 IN | RESPIRATION RATE: 12 BRPM | SYSTOLIC BLOOD PRESSURE: 160 MMHG | BODY MASS INDEX: 34.4 KG/M2 | DIASTOLIC BLOOD PRESSURE: 89 MMHG | TEMPERATURE: 97.9 F

## 2025-02-20 DIAGNOSIS — M54.13 RADICULOPATHY OF CERVICOTHORACIC REGION: ICD-10-CM

## 2025-02-20 LAB
ANION GAP SERPL CALCULATED.3IONS-SCNC: 7 MMOL/L (ref 7–15)
BASOPHILS # BLD AUTO: 0 10E3/UL (ref 0–0.2)
BASOPHILS NFR BLD AUTO: 1 %
BUN SERPL-MCNC: 12.9 MG/DL (ref 6–20)
CALCIUM SERPL-MCNC: 10.7 MG/DL (ref 8.8–10.4)
CHLORIDE SERPL-SCNC: 104 MMOL/L (ref 98–107)
CREAT SERPL-MCNC: 1.25 MG/DL (ref 0.67–1.17)
EGFRCR SERPLBLD CKD-EPI 2021: 73 ML/MIN/1.73M2
EOSINOPHIL # BLD AUTO: 0.2 10E3/UL (ref 0–0.7)
EOSINOPHIL NFR BLD AUTO: 3 %
ERYTHROCYTE [DISTWIDTH] IN BLOOD BY AUTOMATED COUNT: 15.1 % (ref 10–15)
GLUCOSE SERPL-MCNC: 120 MG/DL (ref 70–99)
HCO3 SERPL-SCNC: 32 MMOL/L (ref 22–29)
HCT VFR BLD AUTO: 45.1 % (ref 40–53)
HGB BLD-MCNC: 13.7 G/DL (ref 13.3–17.7)
IMM GRANULOCYTES # BLD: 0 10E3/UL
IMM GRANULOCYTES NFR BLD: 0 %
LYMPHOCYTES # BLD AUTO: 1.3 10E3/UL (ref 0.8–5.3)
LYMPHOCYTES NFR BLD AUTO: 20 %
MCH RBC QN AUTO: 24.2 PG (ref 26.5–33)
MCHC RBC AUTO-ENTMCNC: 30.4 G/DL (ref 31.5–36.5)
MCV RBC AUTO: 80 FL (ref 78–100)
MONOCYTES # BLD AUTO: 0.5 10E3/UL (ref 0–1.3)
MONOCYTES NFR BLD AUTO: 7 %
NEUTROPHILS # BLD AUTO: 4.5 10E3/UL (ref 1.6–8.3)
NEUTROPHILS NFR BLD AUTO: 69 %
NRBC # BLD AUTO: 0 10E3/UL
NRBC BLD AUTO-RTO: 0 /100
PLATELET # BLD AUTO: 251 10E3/UL (ref 150–450)
POTASSIUM SERPL-SCNC: 4.2 MMOL/L (ref 3.4–5.3)
RBC # BLD AUTO: 5.66 10E6/UL (ref 4.4–5.9)
SODIUM SERPL-SCNC: 143 MMOL/L (ref 135–145)
TROPONIN T SERPL HS-MCNC: 7 NG/L
WBC # BLD AUTO: 6.5 10E3/UL (ref 4–11)

## 2025-02-20 PROCEDURE — 99285 EMERGENCY DEPT VISIT HI MDM: CPT | Mod: 25

## 2025-02-20 PROCEDURE — 250N000011 HC RX IP 250 OP 636: Performed by: EMERGENCY MEDICINE

## 2025-02-20 PROCEDURE — 96374 THER/PROPH/DIAG INJ IV PUSH: CPT | Mod: 59

## 2025-02-20 PROCEDURE — 84484 ASSAY OF TROPONIN QUANT: CPT | Performed by: EMERGENCY MEDICINE

## 2025-02-20 PROCEDURE — 82435 ASSAY OF BLOOD CHLORIDE: CPT | Performed by: EMERGENCY MEDICINE

## 2025-02-20 PROCEDURE — 71275 CT ANGIOGRAPHY CHEST: CPT

## 2025-02-20 PROCEDURE — 250N000013 HC RX MED GY IP 250 OP 250 PS 637: Performed by: EMERGENCY MEDICINE

## 2025-02-20 PROCEDURE — 93005 ELECTROCARDIOGRAM TRACING: CPT | Performed by: EMERGENCY MEDICINE

## 2025-02-20 PROCEDURE — 82565 ASSAY OF CREATININE: CPT | Performed by: EMERGENCY MEDICINE

## 2025-02-20 PROCEDURE — 80048 BASIC METABOLIC PNL TOTAL CA: CPT | Performed by: EMERGENCY MEDICINE

## 2025-02-20 PROCEDURE — 85025 COMPLETE CBC W/AUTO DIFF WBC: CPT | Performed by: EMERGENCY MEDICINE

## 2025-02-20 PROCEDURE — 36415 COLL VENOUS BLD VENIPUNCTURE: CPT | Performed by: EMERGENCY MEDICINE

## 2025-02-20 RX ORDER — OMEPRAZOLE 20 MG/1
TABLET, DELAYED RELEASE ORAL
Qty: 45 TABLET | Refills: 0 | Status: SHIPPED | OUTPATIENT
Start: 2025-02-20

## 2025-02-20 RX ORDER — LIDOCAINE 4 G/G
1 PATCH TOPICAL ONCE
Status: DISCONTINUED | OUTPATIENT
Start: 2025-02-20 | End: 2025-02-20 | Stop reason: HOSPADM

## 2025-02-20 RX ORDER — CYCLOBENZAPRINE HCL 10 MG
10 TABLET ORAL 3 TIMES DAILY PRN
Qty: 20 TABLET | Refills: 0 | Status: SHIPPED | OUTPATIENT
Start: 2025-02-20

## 2025-02-20 RX ORDER — IOPAMIDOL 755 MG/ML
90 INJECTION, SOLUTION INTRAVASCULAR ONCE
Status: COMPLETED | OUTPATIENT
Start: 2025-02-20 | End: 2025-02-20

## 2025-02-20 RX ORDER — KETOROLAC TROMETHAMINE 15 MG/ML
10 INJECTION, SOLUTION INTRAMUSCULAR; INTRAVENOUS ONCE
Status: COMPLETED | OUTPATIENT
Start: 2025-02-20 | End: 2025-02-20

## 2025-02-20 RX ORDER — PREDNISONE 20 MG/1
TABLET ORAL
Qty: 10 TABLET | Refills: 0 | Status: SHIPPED | OUTPATIENT
Start: 2025-02-20

## 2025-02-20 RX ADMIN — LIDOCAINE 1 PATCH: 4 PATCH TOPICAL at 10:37

## 2025-02-20 RX ADMIN — KETOROLAC TROMETHAMINE 10 MG: 15 INJECTION, SOLUTION INTRAMUSCULAR; INTRAVENOUS at 10:36

## 2025-02-20 RX ADMIN — IOPAMIDOL 90 ML: 755 INJECTION, SOLUTION INTRAVENOUS at 12:09

## 2025-02-20 ASSESSMENT — COLUMBIA-SUICIDE SEVERITY RATING SCALE - C-SSRS
6. HAVE YOU EVER DONE ANYTHING, STARTED TO DO ANYTHING, OR PREPARED TO DO ANYTHING TO END YOUR LIFE?: NO
1. IN THE PAST MONTH, HAVE YOU WISHED YOU WERE DEAD OR WISHED YOU COULD GO TO SLEEP AND NOT WAKE UP?: NO
2. HAVE YOU ACTUALLY HAD ANY THOUGHTS OF KILLING YOURSELF IN THE PAST MONTH?: NO

## 2025-02-20 ASSESSMENT — ACTIVITIES OF DAILY LIVING (ADL)
ADLS_ACUITY_SCORE: 41

## 2025-02-20 NOTE — ED TRIAGE NOTES
Patient presents here for evaluation of right shoulder pain and sense of numbness, (but he is able to feel touch and temp to the arm). He notes pain over the shoulder area and deep into upper right chest area. Symptoms have persisted for the past 10 days. He also notes a deep cough that has occurred over the past 3-4 weeks.      Triage Assessment (Adult)       Row Name 02/20/25 0958          Triage Assessment    Airway WDL WDL        Respiratory WDL    Respiratory WDL WDL        Skin Circulation/Temperature WDL    Skin Circulation/Temperature WDL WDL        Cardiac WDL    Cardiac WDL WDL        Peripheral/Neurovascular WDL    Peripheral Neurovascular WDL WDL        Cognitive/Neuro/Behavioral WDL    Cognitive/Neuro/Behavioral WDL WDL

## 2025-02-20 NOTE — Clinical Note
Quinn Cohen was seen and treated in our emergency department on 2/20/2025.    Wrote prescripition for cyclobenzaprine.     Sincerely,     [facility] Emergency Department

## 2025-02-20 NOTE — DISCHARGE INSTRUCTIONS
I would start the prednisone today take it with food.  Then, if your stomach tolerates it take ibuprofen 600 mg every 6 hours as needed for the pain.  Make sure to take it with food.    Because he will be taking both of those medications I would also start taking omeprazole to help to protect your stomach.  If the medication causes too much stomach upset or acid indigestion you should talk with your primary care physician about this.    See your primary care clinic within the next few days to talk about next steps in treatment.  Most typically they will set you up with physical therapy and/or chiropractor care to start treatment of this problem.  If you are able to self refer to a chiropractor and you prefer to go there instead of a physical therapist she certainly can but I would recommend physical therapy set up through your primary care physician first.    If these patches helped you today, you can use over the counter muscle pain patches that have numbing medicine in them to help.  Remove the patches we put on today this evening before bed.    Talk with your treatment center about whether or not they are okay with you starting a muscle relaxer called cyclobenzaprine for muscle relaxation.    I see that you also were recently on prednisone.  Being on this recurrently can cause your diabetes to become poorly controlled so do make sure that you follow-up regardless with your primary care physician to follow along with you while needing these medications.

## 2025-02-20 NOTE — ED PROVIDER NOTES
Emergency Department Encounter     Evaluation Date & Time:   2/20/2025 10:07 AM    CHIEF COMPLAINT:  Extremity Weakness      Triage Note:Patient presents here for evaluation of right shoulder pain and sense of numbness, (but he is able to feel touch and temp to the arm). He notes pain over the shoulder area and deep into upper right chest area. Symptoms have persisted for the past 10 days. He also notes a deep cough that has occurred over the past 3-4 weeks.      Triage Assessment (Adult)       Row Name 02/20/25 0958          Triage Assessment    Airway WDL WDL        Respiratory WDL    Respiratory WDL WDL        Skin Circulation/Temperature WDL    Skin Circulation/Temperature WDL WDL        Cardiac WDL    Cardiac WDL WDL        Peripheral/Neurovascular WDL    Peripheral Neurovascular WDL WDL        Cognitive/Neuro/Behavioral WDL    Cognitive/Neuro/Behavioral WDL WDL                         FINAL IMPRESSION:    ICD-10-CM    1. Radiculopathy of cervicothoracic region  M54.13           Impression and Plan     ED COURSE & MEDICAL DECISION MAKING:      10:10 AM I met with the patient, obtained history, performed an initial exam, and discussed options and plan for diagnostics and treatment here in the ED.   ED Course as of 02/20/25 1300   Thu Feb 20, 2025   1022 Though, patient's distribution of numbness and discomfort highly suggest that this is a pinched nerve causing his pain and numbness down his arm.  However, he does have significant high blood pressure today and this started after a bout of coughing.  That does make me concern for possible dissection and/or aneurysm and so we will do CTA.  If this is unremarkable then consider outpatient follow-up with his primary care.  He did recently reestablish care with his primary care physician through [facility].  Do need to be considerate of pain control in this patient as he has not been following his blood sugars very well so steroids though considered may not be an  option as well as anti-inflammatories.  Then, he does have a history of addiction and is just recently out of prison so need to consider pain control issues related to that as well.  For now we will give a dose of anti-inflammatory here as this has been ongoing over the course of the past couple of weeks and although aneurysm is possible I think more likely this is a pinched nerve.   1025 He is not otherwise on blood thinners to suggest epidural hematoma, no trauma, and he has no fever to suggest epidural abscess.   1213 Initial trop is normal, and pain has been persistent for 2 wks, no changes in quality/duration of pain today so no need to repeat troponin, this is a noncardiac chest pain, also no indication for aspirin.   1232 His kidney function is good and bs is adequately controlled.  Can try a course of steroids if his ct shows no other cause of his pain.   1245 CT is without acute finding.  The patient is discharged home with treatment for suspected pinched nerve in the lower C-spine/upper thoracic spine and follow-up with primary care.       At the conclusion of the encounter I discussed the results of all the tests and the disposition. The questions were answered. The patient or family acknowledged understanding and was agreeable with the care plan.          0 minutes of critical care time        MEDICATIONS GIVEN IN THE EMERGENCY DEPARTMENT:  Medications   Lidocaine (LIDOCARE) 4 % Patch 1 patch (1 patch Transdermal $Patch/Med Applied 2/20/25 1037)   ketorolac (TORADOL) injection 10 mg (10 mg Intravenous $Given 2/20/25 1036)   iopamidol (ISOVUE-370) solution 90 mL (90 mLs Intravenous $Given 2/20/25 1209)       NEW PRESCRIPTIONS STARTED AT TODAY'S ED VISIT:  New Prescriptions    CYCLOBENZAPRINE (FLEXERIL) 10 MG TABLET    Take 1 tablet (10 mg) by mouth 3 times daily as needed for muscle spasms. Do not drive if it makes you tired, do not drink alcohol if taking.    OMEPRAZOLE (PRILOSEC OTC) 20 MG EC TABLET     Take bid x 14d, then can decrease to once daily    PREDNISONE (DELTASONE) 20 MG TABLET    Take 3 tab po d 1, then 2 tabs po d 2-5, then 1/2 tab po d 6-7, then discontinue       HPI     HPI     Quinn Cohen is a 44 year old male with a pertinent history of hypertension and diabetes who presents to this ED by walking for evaluation of extremity pain/weakness.    Per patient, he is here because for the past two weeks he has had pain in his right shoulder. The pain shoots down his right arm. Along side the pain he also has right arm/finger numbness that is in the lateral posterior upper arm, across the his elbow, and in the dorsum of his hand.. He is unable to identify exactly where the pain in his right shoulder is. It was noted that he is currently in treatment (exactly what he is in treatment for was not mentioned). He has both diabetes and GERD. For his diabetes he is on metformin. He did also mention that recently he had a cough which has left him with some abdominal pain but he did not elaborate past that.    He has not been dropping stuff due to his numbness/weakness. He is not on insulin. He did not mention taking anything for his symptoms today (02/20/2025).    Chart review:  Per Chart Review, the patient was seen on 02/07/2025 at [facility] Emergency Department for evaluation of cough and shortness of breath. Labs, imaging, and EKG did not show anything of concern. A short course of prednisone was prescribed for the shortness of breath. He was made aware that the prednisone would make his blood sugar run high.     REVIEW OF SYSTEMS:  Review of Systems All systems reviewed are negative unless noted positive in the HPI. Pertinent negatives are also noted in the HPI.         Medical History     No past medical history on file.    Past Surgical History:   Procedure Laterality Date    HERNIA REPAIR         No family history on file.    Social History     Tobacco Use    Smoking status: Every Day     Types:  "Cigarettes   Substance Use Topics    Alcohol use: Yes     Comment: drinks 12 shots of vodka/daily    Drug use: No       cyclobenzaprine (FLEXERIL) 10 MG tablet  omeprazole (PRILOSEC OTC) 20 MG EC tablet  predniSONE (DELTASONE) 20 MG tablet  albuterol (PROAIR HFA/PROVENTIL HFA/VENTOLIN HFA) 108 (90 Base) MCG/ACT inhaler  benzonatate (TESSALON) 100 MG capsule  budesonide-formoterol (SYMBICORT) 80-4.5 MCG/ACT Inhaler  dextromethorphan (DELSYM) 30 MG/5ML liquid  hydrOXYzine (ATARAX) 25 MG tablet  lisinopril (ZESTRIL) 10 MG tablet  lisinopril (ZESTRIL) 10 MG tablet  metFORMIN (GLUCOPHAGE) 500 MG tablet  metoclopramide (REGLAN) 10 MG tablet  omeprazole (PRILOSEC) 40 MG DR capsule  QUEtiapine (SEROQUEL) 25 MG tablet  QUEtiapine (SEROQUEL) 25 MG tablet  sildenafil (VIAGRA) 100 MG tablet  traZODone (DESYREL) 100 MG tablet  triamcinolone (KENALOG) 0.1 % external cream  venlafaxine (EFFEXOR XR) 150 MG 24 hr capsule        Physical Exam     First Vitals:  Patient Vitals for the past 24 hrs:   BP Temp Temp src Pulse Resp SpO2 Height Weight   02/20/25 1230 (!) 150/90 -- -- 52 -- 96 % -- --   02/20/25 1100 131/70 -- -- 70 12 97 % -- --   02/20/25 1030 (!) 186/112 -- -- 78 16 -- -- --   02/20/25 1013 -- -- -- -- -- -- -- 109 kg (240 lb 4.8 oz)   02/20/25 0956 (!) 188/110 97.9  F (36.6  C) Oral 107 20 99 % 1.778 m (5' 10\") --       PHYSICAL EXAM:   Constitutional:   seen sitting up on the edge or the bed, easily conversive   HENT:  Normocephalic, posterior pharynx wnl, mucous membranes moist and dark pink   Eyes:  PERRL, EOMI, Conjunctiva normal, No discharge, no scleral icterus.  Respiratory:  Breathing easily, clear to auscultation  Cardiovascular:  regular rate/rhythm, nl s1s2 0 murmurs, rubs, or gallops.  Peripheral pulses dp, pt, and radial are wnl.  no peripheral edema   GI:  Bowel sounds normal, Soft, No tenderness, No flank tenderness, nondistended.  :No CVA tenderness.   Musculoskeletal:  Moves all extremities.  No " erythematous or swollen major joints, rhomboid tender around T3-T4 area with no obvious spasms, remaining midline neck non-tender  Integument:  skin normal, not diaphoretic, no rash over area of discomfort  Neurologic:  Alert & oriented x 3, Normal motor function, Normal sensory function, No focal deficits noted. Normal speech.  Psychiatric:  Affect normal, Judgment normal, Mood normal.      Results     LAB AND RADIOLOGY:  All pertinent labs reviewed and interpreted  Results for orders placed or performed during the hospital encounter of 02/20/25   CTA Chest Abdomen Pelvis w Contrast     Status: None    Narrative    EXAM: CTA CHEST ABDOMEN PELVIS W CONTRAST  LOCATION: [facility]  DATE: 2/20/2025    INDICATION: sharp pain radiating into r arm with numbness.  htn.  suspect pinched nerve around c6 t1, but hypertensive.  COMPARISON: No prior cross-sectional imaging of the chest, abdomen, or pelvis.  TECHNIQUE: CT angiogram chest abdomen pelvis during arterial phase of injection of IV contrast. 2D and 3D MIP reconstructions were performed by the CT technologist. Dose reduction techniques were used.   CONTRAST: isovue 370 90ml    FINDINGS:   CT ANGIOGRAM CHEST, ABDOMEN, AND PELVIS: Pulsation artifact at the aortic root. Sinotubular junction is preserved. Normal caliber thoracic aorta with conventional arch anatomy. No acute aortic syndrome. Main pulmonary artery is normal in size. There are   no pulmonary artery filling defects. Normal caliber abdominal aorta. There is mild mixed attenuation plaque in the infrarenal segment. The iliac and common femoral arteries are normal in contour and caliber. Mesenteric and renal arteries are normal in   contour and caliber.    LUNGS AND PLEURA: Symmetric normal lung inflation. No interstitial or alveolar opacities. Mild wall thickening of the central airways which can relate to smoking or reactive airway disease. No bronchiectasis. No pleural space  abnormality.    MEDIASTINUM: Cardiac chambers are normal in size. No pericardial effusion. Imaged thyroid gland is normal. No enlarged mediastinal or hilar lymph nodes. There is a small hiatal hernia.    CORONARY ARTERY CALCIFICATION: None.    HEPATOBILIARY:  A 14 x 15 mm lesion with subtle peripheral nodular enhancement in the anterior liver (series 5, image 157), consistent with a benign hemangioma. No other liver lesions. Liver is normal in size with smooth capsule. Gallbladder and bile   ducts are normal.    PANCREAS: Normal.    SPLEEN: Normal.    ADRENAL GLANDS: Normal.    KIDNEYS/BLADDER: Normal.    BOWEL: Diverticulosis of the colon. No acute inflammatory change. No obstruction.  Normal appendix.    LYMPH NODES: Normal.    PELVIC ORGANS: Normal.    MUSCULOSKELETAL: Thoracic and lumbar vertebra are maintained in height and shape. No spondylolisthesis. No focal bone lesions. The chest and body wall soft tissues are symmetric.      Impression    IMPRESSION:    1.  No acute pulmonary embolism or aortopathy.  2.  No findings that would explain sharp pain radiating to the right arm.  3.  Mild symmetric wall thickening of the central airways consistent with mild bronchitis. Smoking related airway inflammation or reactive airway disease or most likely.  4.  Small hiatal hernia.  5.  Diverticulosis of the colon.     Basic metabolic panel     Status: Abnormal   Result Value Ref Range    Sodium 143 135 - 145 mmol/L    Potassium 4.2 3.4 - 5.3 mmol/L    Chloride 104 98 - 107 mmol/L    Carbon Dioxide (CO2) 32 (H) 22 - 29 mmol/L    Anion Gap 7 7 - 15 mmol/L    Urea Nitrogen 12.9 6.0 - 20.0 mg/dL    Creatinine 1.25 (H) 0.67 - 1.17 mg/dL    GFR Estimate 73 >60 mL/min/1.73m2    Calcium 10.7 (H) 8.8 - 10.4 mg/dL    Glucose 120 (H) 70 - 99 mg/dL   Troponin T, High Sensitivity     Status: Normal   Result Value Ref Range    Troponin T, High Sensitivity 7 <=22 ng/L   CBC with platelets and differential     Status: Abnormal   Result  Value Ref Range    WBC Count 6.5 4.0 - 11.0 10e3/uL    RBC Count 5.66 4.40 - 5.90 10e6/uL    Hemoglobin 13.7 13.3 - 17.7 g/dL    Hematocrit 45.1 40.0 - 53.0 %    MCV 80 78 - 100 fL    MCH 24.2 (L) 26.5 - 33.0 pg    MCHC 30.4 (L) 31.5 - 36.5 g/dL    RDW 15.1 (H) 10.0 - 15.0 %    Platelet Count 251 150 - 450 10e3/uL    % Neutrophils 69 %    % Lymphocytes 20 %    % Monocytes 7 %    % Eosinophils 3 %    % Basophils 1 %    % Immature Granulocytes 0 %    NRBCs per 100 WBC 0 <1 /100    Absolute Neutrophils 4.5 1.6 - 8.3 10e3/uL    Absolute Lymphocytes 1.3 0.8 - 5.3 10e3/uL    Absolute Monocytes 0.5 0.0 - 1.3 10e3/uL    Absolute Eosinophils 0.2 0.0 - 0.7 10e3/uL    Absolute Basophils 0.0 0.0 - 0.2 10e3/uL    Absolute Immature Granulocytes 0.0 <=0.4 10e3/uL    Absolute NRBCs 0.0 10e3/uL   CBC with platelets differential     Status: Abnormal    Narrative    The following orders were created for panel order CBC with platelets differential.  Procedure                               Abnormality         Status                     ---------                               -----------         ------                     CBC with platelets and d...[[id number]]  Abnormal            Final result                 Please view results for these tests on the individual orders.         ECG:    Performed at: 02/20/2025 at 10:31 AM    Impression: Sinus rhythm, normal ECG    Rate: 68 BPM  Rhythm: Sinus  Axis: 40, -11, 58  MA Interval: 138 ms  QRS Interval: 90 ms  QTc Interval: 378 ms  ST Changes: None  Comparison: When compared to ECG from 20/07/2025 at 11:10 AM QT has shortened    I have independently reviewed and interpreted the EKS(s) documented above    PROCEDURES:  Procedures:      [facility] Documentation     Medical Decision Making  Obtained supplemental history:Supplemental history obtained?: No  Reviewed external records: External records reviewed?: Documented in chart and Inpatient Record: Emergency Department visit from  02/07/2025  Care impacted by chronic illness:Documented in Chart, Diabetes, and Hypertension  Did you consider but not order tests?: Work up considered but not performed and documented in chart, if applicable  Did you interpret images independently?: Independent interpretation of ECG and images noted in documentation, when applicable.  Consultation discussion with other provider:Did you involve another provider (consultant, , pharmacy, etc.)?: No  Discharge. I prescribed additional prescription strength medication(s) as charted. See documentation for any additional details.    MIPS (CTPE, Dental pain, [place], Sinusitis, Asthma/COPD, Head Trauma): Not Applicable      CMS Diagnoses: None       The creation of this record is based on the scribe s observations of the work being performed by Clifton Henson and the provider s statements to them. This document has been checked and approved by MD Ml Rabago MD  Emergency Medicine  [facility] EMERGENCY DEPARTMENT        Ml Cota MD  02/20/25 1300

## 2025-02-26 LAB
ATRIAL RATE - MUSE: 68 BPM
DIASTOLIC BLOOD PRESSURE - MUSE: NORMAL MMHG
INTERPRETATION ECG - MUSE: NORMAL
P AXIS - MUSE: 40 DEGREES
PR INTERVAL - MUSE: 138 MS
QRS DURATION - MUSE: 90 MS
QT - MUSE: 356 MS
QTC - MUSE: 378 MS
R AXIS - MUSE: -11 DEGREES
SYSTOLIC BLOOD PRESSURE - MUSE: NORMAL MMHG
T AXIS - MUSE: 58 DEGREES
VENTRICULAR RATE- MUSE: 68 BPM

## 2025-04-25 ENCOUNTER — OFFICE VISIT (OUTPATIENT)
Dept: URGENT CARE | Facility: URGENT CARE | Age: 45
End: 2025-04-25
Payer: MEDICAID

## 2025-04-25 VITALS
HEART RATE: 96 BPM | TEMPERATURE: 97.9 F | SYSTOLIC BLOOD PRESSURE: 156 MMHG | DIASTOLIC BLOOD PRESSURE: 101 MMHG | RESPIRATION RATE: 22 BRPM | OXYGEN SATURATION: 97 %

## 2025-04-25 DIAGNOSIS — N34.2 URETHRITIS: Primary | ICD-10-CM

## 2025-04-25 DIAGNOSIS — R36.9 PENILE DISCHARGE: ICD-10-CM

## 2025-04-25 PROCEDURE — 87491 CHLMYD TRACH DNA AMP PROBE: CPT | Performed by: EMERGENCY MEDICINE

## 2025-04-25 PROCEDURE — 3077F SYST BP >= 140 MM HG: CPT | Performed by: EMERGENCY MEDICINE

## 2025-04-25 PROCEDURE — 87591 N.GONORRHOEAE DNA AMP PROB: CPT | Performed by: EMERGENCY MEDICINE

## 2025-04-25 PROCEDURE — 3080F DIAST BP >= 90 MM HG: CPT | Performed by: EMERGENCY MEDICINE

## 2025-04-25 PROCEDURE — 96372 THER/PROPH/DIAG INJ SC/IM: CPT | Performed by: EMERGENCY MEDICINE

## 2025-04-25 PROCEDURE — 99213 OFFICE O/P EST LOW 20 MIN: CPT | Mod: 25 | Performed by: EMERGENCY MEDICINE

## 2025-04-25 RX ORDER — CEFTRIAXONE SODIUM 1 G
500 VIAL (EA) INJECTION ONCE
Status: COMPLETED | OUTPATIENT
Start: 2025-04-25 | End: 2025-04-25

## 2025-04-25 RX ORDER — DOXYCYCLINE 100 MG/1
100 CAPSULE ORAL 2 TIMES DAILY
Qty: 14 CAPSULE | Refills: 0 | Status: SHIPPED | OUTPATIENT
Start: 2025-04-25 | End: 2025-05-02

## 2025-04-25 RX ADMIN — Medication 500 MG: at 15:53

## 2025-04-25 NOTE — PROGRESS NOTES
Urgent Care Clinic Visit    Chief Complaint   Patient presents with    STD     Penile discharge x noticed few days. No itching. No blood in urine.                4/25/2025     3:27 PM   Additional Questions   Roomed by Etienne Chauhan MA   Accompanied by Self         4/25/2025   Declines Weight   Did patient decline having their weight taken? Yes       Etienne Chauhan MA on 04/25/2025 at 3:29 PM

## 2025-04-26 ENCOUNTER — TELEPHONE (OUTPATIENT)
Dept: NURSING | Facility: CLINIC | Age: 45
End: 2025-04-26
Payer: MEDICAID

## 2025-04-26 ENCOUNTER — TELEPHONE (OUTPATIENT)
Dept: URGENT CARE | Facility: URGENT CARE | Age: 45
End: 2025-04-26
Payer: MEDICAID

## 2025-04-26 LAB
C TRACH DNA SPEC QL PROBE+SIG AMP: NEGATIVE
N GONORRHOEA DNA SPEC QL NAA+PROBE: POSITIVE
SPECIMEN TYPE: ABNORMAL

## 2025-04-26 NOTE — TELEPHONE ENCOUNTER
Left VM for pt to call back. Please relay result and provider message if pt call back. Will reach out to pt at later time if no call back.      Juan Diallo MA on 4/26/2025 at 1:08 PM

## 2025-04-26 NOTE — TELEPHONE ENCOUNTER
Pt returning call from clinic and writer can see a note and relayed the following message to pt.           4/26/25  1:06 PM  Note  ----- Message from Romina Castillo sent at 4/26/2025 12:56 PM CDT -----  Tested positive for gonorrhea.  He was treated appropriately with Rocephin in the clinic.  No further treatment needed.  Please call patient notify of results.     .sighn          No triage     Vidhya Gómez RN  [facility] Nurse Advisor  3:32 PM 4/26/2025

## 2025-04-26 NOTE — TELEPHONE ENCOUNTER
----- Message from Romina BRIE Castillo sent at 4/26/2025 12:56 PM CDT -----  Tested positive for gonorrhea.  He was treated appropriately with Rocephin in the clinic.  No further treatment needed.  Please call patient notify of results.    .aida

## 2025-04-28 NOTE — PROGRESS NOTES
Assessment & Plan     Diagnosis:    ICD-10-CM    1. Urethritis  N34.2 cefTRIAXone (ROCEPHIN) in lidocaine 1% for IM administration only 500 mg     doxycycline hyclate (VIBRAMYCIN) 100 MG capsule     Chlamydia & Gonorrhea by PCR, [facility] - Clinic Collect      2. Penile discharge  R36.9 cefTRIAXone (ROCEPHIN) in lidocaine 1% for IM administration only 500 mg     doxycycline hyclate (VIBRAMYCIN) 100 MG capsule     Chlamydia & Gonorrhea by PCR, [facility] - Clinic Collect          Medical Decision Making:  Quinn Cohen is a 44 year old male who presented with concern for sexually transmitted infection.   The patient came in for evaluation because of penile discharge and recent unprotected intercourse.   The patient had abnormal discharge, no testicular pain/masses. The patient was treated empirically with Rocephin and Doxycycline.  The patient was informed that we are not providing comprehensive STD testing or treatment and that she needs to follow up with a STD clinic or his primary care provider for complete testing.  Additionally, the patient was informed that they need to abstain from sexual activity until cleared at follow up and for at least 10 days.  The patient was also that swabs will come back with results at a later date and they will be contacted only if positive.    The patient was given return precautions and follow up instructions, they state understanding of these and ability to comply.    With reasonable clinical certainty, I believe the patient is safe for discharge and can be safely managed as an outpatient with close PCP or PP/red door clinic for follow up.      Joseluis Caban PA-C  [facility] URGENT CARE    Subjective     Quinn Cohen is a 44 year old male who presents to clinic today for the following health issues:  Chief Complaint   Patient presents with    STD     Penile discharge x noticed few days. No itching. No blood in urine.        HPI  Patient notes that he started to have  slight penile discharge few days ago, some slight pain with urination.  He notes he did have unprotected sex with 2 women recently, is concerned about STD since he had chlamydia in the past.  He denies any abdominal pain, fevers, testicular pain or swelling, difficulties urinating, rashes or other concerns.    Review of Systems    See HPI    Objective      Vitals: BP (!) 156/101   Pulse 96   Temp 97.9  F (36.6  C) (Tympanic)   Resp 22   SpO2 97%       Vital signs reviewed by: Joseluis Caban PA-C    Physical Exam   Constitutional: Patient is alert and cooperative. No acute distress.  Cardiovascular: Regular rate and rhythm  Pulmonary/Chest: Lungs are clear to auscultation throughout. Effort normal. No respiratory distress. No wheezes, rales or rhonchi.  GI: Abdomen is soft and non-tender throughout. No CVA tenderness bilaterally.  : scant milky/white penile discharge noted. No testicular pain or masses. No inguinal hernia or lymphadenopathy.  Skin: No rash noted on visualized skin.  Psychiatric:The patient has a normal mood and affect.     Labs/Imaging:  GC/Chlamydia PCR: pending    Interventions:  Rocephin 500mg IM    Joseluis Caban PA-C, April 28, 2025

## 2025-05-01 ENCOUNTER — HOSPITAL ENCOUNTER (EMERGENCY)
Facility: HOSPITAL | Age: 45
Discharge: HOME | End: 2025-05-01
Payer: COMMERCIAL

## 2025-05-01 ENCOUNTER — APPOINTMENT (OUTPATIENT)
Dept: RADIOLOGY | Facility: HOSPITAL | Age: 45
End: 2025-05-01
Attending: STUDENT IN AN ORGANIZED HEALTH CARE EDUCATION/TRAINING PROGRAM
Payer: COMMERCIAL

## 2025-05-01 VITALS
HEART RATE: 100 BPM | TEMPERATURE: 99.4 F | BODY MASS INDEX: 33.53 KG/M2 | RESPIRATION RATE: 20 BRPM | OXYGEN SATURATION: 98 % | WEIGHT: 234.2 LBS | HEIGHT: 70 IN | DIASTOLIC BLOOD PRESSURE: 81 MMHG | SYSTOLIC BLOOD PRESSURE: 138 MMHG

## 2025-05-01 DIAGNOSIS — J06.9 VIRAL URI WITH COUGH: ICD-10-CM

## 2025-05-01 DIAGNOSIS — R11.2 NAUSEA AND VOMITING, UNSPECIFIED VOMITING TYPE: ICD-10-CM

## 2025-05-01 LAB
ALBUMIN SERPL BCG-MCNC: 4.2 G/DL (ref 3.5–5.2)
ALP SERPL-CCNC: 64 U/L (ref 40–150)
ALT SERPL W P-5'-P-CCNC: 25 U/L (ref 0–70)
ANION GAP SERPL CALCULATED.3IONS-SCNC: 10 MMOL/L (ref 7–15)
AST SERPL W P-5'-P-CCNC: 32 U/L (ref 0–45)
BASOPHILS # BLD AUTO: 0 10E3/UL (ref 0–0.2)
BASOPHILS NFR BLD AUTO: 0 %
BILIRUB DIRECT SERPL-MCNC: 0.12 MG/DL (ref 0–0.3)
BILIRUB SERPL-MCNC: 0.4 MG/DL
BUN SERPL-MCNC: 13.3 MG/DL (ref 6–20)
CALCIUM SERPL-MCNC: 9.8 MG/DL (ref 8.8–10.4)
CHLORIDE SERPL-SCNC: 98 MMOL/L (ref 98–107)
CREAT SERPL-MCNC: 1.34 MG/DL (ref 0.67–1.17)
EGFRCR SERPLBLD CKD-EPI 2021: 67 ML/MIN/1.73M2
EOSINOPHIL # BLD AUTO: 0 10E3/UL (ref 0–0.7)
EOSINOPHIL NFR BLD AUTO: 0 %
ERYTHROCYTE [DISTWIDTH] IN BLOOD BY AUTOMATED COUNT: 14.8 % (ref 10–15)
FLUAV RNA SPEC QL NAA+PROBE: NEGATIVE
FLUBV RNA RESP QL NAA+PROBE: NEGATIVE
GLUCOSE SERPL-MCNC: 119 MG/DL (ref 70–99)
HCO3 SERPL-SCNC: 28 MMOL/L (ref 22–29)
HCT VFR BLD AUTO: 47.7 % (ref 40–53)
HGB BLD-MCNC: 14.6 G/DL (ref 13.3–17.7)
IMM GRANULOCYTES # BLD: 0.1 10E3/UL
IMM GRANULOCYTES NFR BLD: 1 %
LIPASE SERPL-CCNC: 83 U/L (ref 13–60)
LYMPHOCYTES # BLD AUTO: 1.1 10E3/UL (ref 0.8–5.3)
LYMPHOCYTES NFR BLD AUTO: 13 %
MAGNESIUM SERPL-MCNC: 1.7 MG/DL (ref 1.7–2.3)
MCH RBC QN AUTO: 24.3 PG (ref 26.5–33)
MCHC RBC AUTO-ENTMCNC: 30.6 G/DL (ref 31.5–36.5)
MCV RBC AUTO: 80 FL (ref 78–100)
MONOCYTES # BLD AUTO: 0.5 10E3/UL (ref 0–1.3)
MONOCYTES NFR BLD AUTO: 6 %
NEUTROPHILS # BLD AUTO: 6.7 10E3/UL (ref 1.6–8.3)
NEUTROPHILS NFR BLD AUTO: 80 %
NRBC # BLD AUTO: 0 10E3/UL
NRBC BLD AUTO-RTO: 0 /100
PLATELET # BLD AUTO: 226 10E3/UL (ref 150–450)
POTASSIUM SERPL-SCNC: 4.5 MMOL/L (ref 3.4–5.3)
PROT SERPL-MCNC: 7 G/DL (ref 6.4–8.3)
RBC # BLD AUTO: 6 10E6/UL (ref 4.4–5.9)
RSV RNA SPEC NAA+PROBE: NEGATIVE
S PYO DNA THROAT QL NAA+PROBE: NOT DETECTED
SARS-COV-2 RNA RESP QL NAA+PROBE: NEGATIVE
SODIUM SERPL-SCNC: 136 MMOL/L (ref 135–145)
TROPONIN T SERPL HS-MCNC: 11 NG/L
WBC # BLD AUTO: 8.4 10E3/UL (ref 4–11)

## 2025-05-01 PROCEDURE — 87637 SARSCOV2&INF A&B&RSV AMP PRB: CPT | Performed by: STUDENT IN AN ORGANIZED HEALTH CARE EDUCATION/TRAINING PROGRAM

## 2025-05-01 PROCEDURE — 80048 BASIC METABOLIC PNL TOTAL CA: CPT | Performed by: STUDENT IN AN ORGANIZED HEALTH CARE EDUCATION/TRAINING PROGRAM

## 2025-05-01 PROCEDURE — 93005 ELECTROCARDIOGRAM TRACING: CPT | Performed by: STUDENT IN AN ORGANIZED HEALTH CARE EDUCATION/TRAINING PROGRAM

## 2025-05-01 PROCEDURE — 84484 ASSAY OF TROPONIN QUANT: CPT | Performed by: STUDENT IN AN ORGANIZED HEALTH CARE EDUCATION/TRAINING PROGRAM

## 2025-05-01 PROCEDURE — 250N000013 HC RX MED GY IP 250 OP 250 PS 637: Performed by: STUDENT IN AN ORGANIZED HEALTH CARE EDUCATION/TRAINING PROGRAM

## 2025-05-01 PROCEDURE — 258N000003 HC RX IP 258 OP 636: Performed by: STUDENT IN AN ORGANIZED HEALTH CARE EDUCATION/TRAINING PROGRAM

## 2025-05-01 PROCEDURE — 99285 EMERGENCY DEPT VISIT HI MDM: CPT | Mod: 25

## 2025-05-01 PROCEDURE — 83690 ASSAY OF LIPASE: CPT | Performed by: STUDENT IN AN ORGANIZED HEALTH CARE EDUCATION/TRAINING PROGRAM

## 2025-05-01 PROCEDURE — 87651 STREP A DNA AMP PROBE: CPT | Performed by: STUDENT IN AN ORGANIZED HEALTH CARE EDUCATION/TRAINING PROGRAM

## 2025-05-01 PROCEDURE — 93005 ELECTROCARDIOGRAM TRACING: CPT

## 2025-05-01 PROCEDURE — 96374 THER/PROPH/DIAG INJ IV PUSH: CPT

## 2025-05-01 PROCEDURE — 71046 X-RAY EXAM CHEST 2 VIEWS: CPT

## 2025-05-01 PROCEDURE — 83735 ASSAY OF MAGNESIUM: CPT | Performed by: STUDENT IN AN ORGANIZED HEALTH CARE EDUCATION/TRAINING PROGRAM

## 2025-05-01 PROCEDURE — 85004 AUTOMATED DIFF WBC COUNT: CPT | Performed by: STUDENT IN AN ORGANIZED HEALTH CARE EDUCATION/TRAINING PROGRAM

## 2025-05-01 PROCEDURE — 36415 COLL VENOUS BLD VENIPUNCTURE: CPT | Performed by: STUDENT IN AN ORGANIZED HEALTH CARE EDUCATION/TRAINING PROGRAM

## 2025-05-01 PROCEDURE — 96361 HYDRATE IV INFUSION ADD-ON: CPT

## 2025-05-01 PROCEDURE — 250N000011 HC RX IP 250 OP 636: Performed by: STUDENT IN AN ORGANIZED HEALTH CARE EDUCATION/TRAINING PROGRAM

## 2025-05-01 PROCEDURE — 96375 TX/PRO/DX INJ NEW DRUG ADDON: CPT

## 2025-05-01 PROCEDURE — 84155 ASSAY OF PROTEIN SERUM: CPT | Performed by: STUDENT IN AN ORGANIZED HEALTH CARE EDUCATION/TRAINING PROGRAM

## 2025-05-01 RX ORDER — PANTOPRAZOLE SODIUM 40 MG/1
40 TABLET, DELAYED RELEASE ORAL ONCE
Status: COMPLETED | OUTPATIENT
Start: 2025-05-01 | End: 2025-05-01

## 2025-05-01 RX ORDER — KETOROLAC TROMETHAMINE 15 MG/ML
15 INJECTION, SOLUTION INTRAMUSCULAR; INTRAVENOUS ONCE
Status: COMPLETED | OUTPATIENT
Start: 2025-05-01 | End: 2025-05-01

## 2025-05-01 RX ORDER — IBUPROFEN 600 MG/1
600 TABLET, FILM COATED ORAL ONCE
Status: DISCONTINUED | OUTPATIENT
Start: 2025-05-01 | End: 2025-05-01

## 2025-05-01 RX ORDER — ONDANSETRON 2 MG/ML
4 INJECTION INTRAMUSCULAR; INTRAVENOUS ONCE
Status: COMPLETED | OUTPATIENT
Start: 2025-05-01 | End: 2025-05-01

## 2025-05-01 RX ADMIN — SODIUM CHLORIDE 1000 ML: 0.9 INJECTION, SOLUTION INTRAVENOUS at 17:41

## 2025-05-01 RX ADMIN — ONDANSETRON 4 MG: 2 INJECTION, SOLUTION INTRAMUSCULAR; INTRAVENOUS at 17:44

## 2025-05-01 RX ADMIN — PANTOPRAZOLE SODIUM 40 MG: 40 TABLET, DELAYED RELEASE ORAL at 17:47

## 2025-05-01 RX ADMIN — KETOROLAC TROMETHAMINE 15 MG: 15 INJECTION, SOLUTION INTRAMUSCULAR; INTRAVENOUS at 17:46

## 2025-05-01 ASSESSMENT — COLUMBIA-SUICIDE SEVERITY RATING SCALE - C-SSRS
2. HAVE YOU ACTUALLY HAD ANY THOUGHTS OF KILLING YOURSELF IN THE PAST MONTH?: NO
1. IN THE PAST MONTH, HAVE YOU WISHED YOU WERE DEAD OR WISHED YOU COULD GO TO SLEEP AND NOT WAKE UP?: NO
6. HAVE YOU EVER DONE ANYTHING, STARTED TO DO ANYTHING, OR PREPARED TO DO ANYTHING TO END YOUR LIFE?: NO

## 2025-05-01 ASSESSMENT — ACTIVITIES OF DAILY LIVING (ADL): ADLS_ACUITY_SCORE: 41

## 2025-05-01 NOTE — ED PROVIDER NOTES
"Emergency Department Encounter   NAME: Quinn Cohen ; AGE: 44 year old male ; YOB: 1980 ; MRN: [id number] ; PCP: No Ref-Primary, Physician   ED PROVIDER: Yudelka Anderson PA-C    Evaluation Date & Time:   No admission date for patient encounter.    CHIEF COMPLAINT:  Dizziness        Impression and Plan   FINAL IMPRESSION:  No diagnosis found.    ED Course and Medical Decision Making  Quinn Cohen is a 44 year old male with a pertinent history of essential hypertension, GERD, asthma, constipation, and type 2 diabetes who presents to the ED for evaluation of fevers, nausea and vomiting, body aches, fatigue, and dizziness for the past 3 days. He also reports sore throat, nasal congestion, and mild non productive cough.  Denies any diarrhea.  States he typically has some constipation at baseline, last BM was 2 days ago.  Patient was having urethral discharge and tested positive for gonorrhea last week.  He was given IM Rocephin in clinic.  He was also given course of doxycycline but misplaced this.  He states he \"feels sick and does not have anyone at home to take care of him\". He reports homelessness at the time.  No chest pain or shortness of breath.    Vitals reviewed, he is initially tachycardic with pulse of 112. Afebrile. On exam he appears warm, no acute distress. Differential diagnosis/emergent conditions considered and evaluated for includes but not limited to COVID, influenza, viral URI, strep pharyngitis, pneumonia, asthma exacerbation. He has mild rhonchi present bilaterally in the lung bases. No wheezing or stridor.  Heart rate mildly fast but regular.    Patient did not endorse any chest pain or shortness of breath to me.  I did not order EKG, however, staff in triage must of ordered it when patient was endorsing dizziness to them.  This shows sinus tachycardia with rate of 105 bpm.  No evidence of prolonged QT or concerning arrhythmia.  No evidence of ischemia.  Initial troponin is 11 and " "negative for ACS.  His symptoms have been ongoing for the past 3 days and I have low suspicion for acute cardiac etiology for his symptoms.    CBC is without leukocytosis or evidence of anemia.  BMP does not find any significant electrolyte abnormalities.  Creatinine 1.34 today.  This is slightly elevated from 1.25 two months ago indicating mild VISHNU, likely due to dehydration and vomiting.  1 L IV NS was ordered in addition to Zofran, and Toradol.  Hepatic function normal.  Lipase is slightly elevated at 83.  This value is not consistent with acute pancreatitis and patient does not have any epigastric pain so I think pancreatitis is unlikely. Strep swab negative.       He does not need any further treatment or refill of antibiotics at this time as his gonorrhea should have been appropriately treated with Rocephin in clinic last week.      Medical Decision Making  I reviewed the EMR: Outpatient Record: [facility] on 25-Apr-2025  Care impacted by asthma, Diabetes, and Hypertension  Discharge. {zvprescriptionmeds:751062}. {zvadmissionconsidered:989576::\"See documentation for any additional details\"}.    MIPS (CTPE, Dental pain, Marrero, Sinusitis, Asthma/COPD, Head Trauma): {ECC MIPS DOCUMENTATION:228567}    SEPSIS: {Sepsis/Stemi/Stroke:617834::\"None\"}        ***I considered escalation of care and admitting the patient but ultimately did not given reassuring exam and workup.      ED COURSE:  4:12 PM I met and introduced myself to the patient. I gathered initial history and performed my physical exam. We discussed plan for initial workup.   7:28 PM I rechecked the patient and discussed results, discharge, follow up, and reasons to return to the ED.     At the conclusion of the encounter I discussed the results of all the tests and the disposition. The questions were answered. The patient or family acknowledged understanding and was agreeable with the care plan.      Critical Care     Performed by: {ECC " Provider:[id number]}  Authorized by: {[facility] Provider:[id number]}  Total critical care time: *** minutes  Critical care was necessary to treat or prevent imminent or life-threatening deterioration of the following conditions: ***  Critical care was time spent personally by me on the following activities: development of treatment plan with patient or surrogate, discussions with consultants, examination of patient, evaluation of patient's response to treatment, obtaining history from patient or surrogate, ordering and performing treatments and interventions, ordering and review of laboratory studies, ordering and review of radiographic studies, re-evaluation of patient's condition and monitoring for potential decompensation.  Critical care time was exclusive of separately billable procedures and treating other patients.        MEDICATIONS GIVEN IN THE EMERGENCY DEPARTMENT:  Medications   pantoprazole (PROTONIX) EC tablet 40 mg (40 mg Oral $Given 5/1/25 1747)   sodium chloride 0.9% BOLUS 1,000 mL (0 mLs Intravenous Stopped 5/1/25 1847)   ondansetron (ZOFRAN) injection 4 mg (4 mg Intravenous $Given 5/1/25 9674)   ketorolac (TORADOL) injection 15 mg (15 mg Intravenous $Given 5/1/25 1746)         NEW PRESCRIPTIONS STARTED AT TODAY'S ED VISIT:  New Prescriptions    No medications on file         HPI     Quinn Cohen is a 44 year old male with a pertinent history of diabetes mellitus type 2, asthma, hypertension, who presents to the ED by walk-in for evaluation of dizziness.    Last week, the patient noted penile discharge and went in, getting a shot for chlamydia and started on antibiotics. He took 4 pills and then lost the medications and hasn't taken any since. Then in the last 2-3 days, the patient went to [email or web address] to eat and soon after developed dizziness, cough, diaphoresis, vomiting, fatigue, sore throat, and subjective fevers. He has been laid up in bed due to this. He also had no bowel movement in 2 days. Now, with the  "ongoing symptoms that aren't improving, he presents to the ER for evaluation and to get more antibiotics for his STI. He has not had any Tylenol or ibuprofen because he can only take it after his omeprazole and he hasn't been able to take that due to the vomiting.     The patient has a history of asthma and tonsillectomy.     Per Chart Review:  Visit to [facility] on 25-Apr-2025 for STI. Penile discharge and unprotected sex. Given Rocephin in urgent care. Discharged with Doxycycline. Swabs sent for chlamydia and gonorrhea.    Per labs: patient positive for gonorrhea. Negative for chlamydia.        Physical Exam     First Vitals:  Patient Vitals for the past 24 hrs:   BP Temp Temp src Pulse Resp SpO2 Height Weight   05/01/25 1742 -- 99.4  F (37.4  C) Oral -- -- -- -- --   05/01/25 1508 138/81 (!) 96  F (35.6  C) Temporal 112 20 98 % 1.778 m (5' 10\") 106.2 kg (234 lb 3.2 oz)         PHYSICAL EXAM    General Appearance:  Alert, cooperative, no distress, appears stated age  HENT: Normocephalic without obvious deformity, atraumatic. Mucous membranes moist. Posterior pharynx is not erythematous or edematous, no exudates. No tonsillar swelling. No uvular swelling or deviation. No abscess or swelling of the floor of the mouth. No tongue protrusion or drooling. No facial or neck swelling.   Eyes: Conjunctiva clear, Lids normal. No discharge.   Respiratory: No distress.  Mild rhonchi present bilaterally in the lung bases.  No wheezing or stridor.  Cardiovascular: Regular rate and rhythm, no murmur. Normal cap refill. No peripheral edema  GI: Abdomen soft, nontender  Musculoskeletal: Moving all extremities. No gross deformities  Integument: Warm, dry, no rashes or lesions  Neurologic: Alert and orientated x3. No focal deficits.  Psych: Normal mood and affect        Results     LAB:  All pertinent labs reviewed and interpreted  Labs Ordered and Resulted from Time of ED Arrival to Time of ED Departure   BASIC " METABOLIC PANEL - Abnormal       Result Value    Sodium 136      Potassium 4.5      Chloride 98      Carbon Dioxide (CO2) 28      Anion Gap 10      Urea Nitrogen 13.3      Creatinine 1.34 (*)     GFR Estimate 67      Calcium 9.8      Glucose 119 (*)    LIPASE - Abnormal    Lipase 83 (*)    CBC WITH PLATELETS AND DIFFERENTIAL - Abnormal    WBC Count 8.4      RBC Count 6.00 (*)     Hemoglobin 14.6      Hematocrit 47.7      MCV 80      MCH 24.3 (*)     MCHC 30.6 (*)     RDW 14.8      Platelet Count 226      % Neutrophils 80      % Lymphocytes 13      % Monocytes 6      % Eosinophils 0      % Basophils 0      % Immature Granulocytes 1      NRBCs per 100 WBC 0      Absolute Neutrophils 6.7      Absolute Lymphocytes 1.1      Absolute Monocytes 0.5      Absolute Eosinophils 0.0      Absolute Basophils 0.0      Absolute Immature Granulocytes 0.1      Absolute NRBCs 0.0     INFLUENZA A/B, RSV AND SARS-COV2 PCR - Normal    Influenza A PCR Negative      Influenza B PCR Negative      RSV PCR Negative      SARS CoV2 PCR Negative     HEPATIC FUNCTION PANEL - Normal    Protein Total 7.0      Albumin 4.2      Bilirubin Total 0.4      Alkaline Phosphatase 64      AST 32      ALT 25      Bilirubin Direct 0.12     MAGNESIUM - Normal    Magnesium 1.7     TROPONIN T, HIGH SENSITIVITY - Normal    Troponin T, High Sensitivity 11     GROUP A STREPTOCOCCUS PCR THROAT SWAB - Normal    Group A strep by PCR Not Detected         RADIOLOGY:  Chest XR,  PA & LAT   Final Result   IMPRESSION: Negative chest.            ECG:  Performed at: ***    Impression: ***    Rate: ***  Rhythm: ***  Axis: ***  NJ Interval: ***  QRS Interval: ***  QTc Interval: ***  ST Changes: ***  Comparison: ***    EKG results reviewed and interpreted by  ***, ED MD.       PROCEDURES:  ***      Jarrett LAMA, am serving as a scribe to document services personally performed by Yudelka Anderson PA-C, based on my observation and the provider's statements to me. Yudelka LAMA  MARC Anderson attest that Jarrett Bueno is acting in a scribe capacity, has observed my performance of the services and has documented them in accordance with my direction.       Yudelka Anderson PA-C   Emergency Medicine   [facility] EMERGENCY DEPARTMENT

## 2025-05-01 NOTE — Clinical Note
Quinn Cohen was seen and treated in our emergency department on 5/1/2025.         Sincerely,     [facility] Emergency Department

## 2025-05-01 NOTE — ED TRIAGE NOTES
Constant dizziness (room spinning) since yesterday along with nausea. Found to have chlamydia about a week ago, has not taken medication for treatment after misplacing medication. Reports homelessness at this time. Also reports sore throat and fatigue.   Diabetic, has not taken metformin.

## 2025-05-02 NOTE — DISCHARGE INSTRUCTIONS
Your COVID and flu swabs are negative today.  Strep swab is also negative.  I suspect you likely have a viral upper respiratory illness causing your symptoms.   Be sure you are drinking lots of fluids    You may take Ibuprofen up to 400 mg by mouth every 4-6 hours as needed for pain. Do not exceed 2400 mg/day.  You may take Tylenol 325-1000 mg by mouth every 4-6 hours as needed for pain. Do not exceed 1000mg (1g) in 4 hours or 4 g/day from all sources.  You may combine Tylenol and Ibuprofen- up to 400 mg of ibuprofen and 1000 mg of Tylenol at the same time, up to 3 times a day for the pain    Your chest x-ray does not show any evidence of pneumonia.  You should have been fully treated for gonorrhea when you got the dose of intramuscular medication in clinic last week, you do not need any additional antibiotics.